# Patient Record
Sex: FEMALE | Race: WHITE | NOT HISPANIC OR LATINO | Employment: OTHER | URBAN - METROPOLITAN AREA
[De-identification: names, ages, dates, MRNs, and addresses within clinical notes are randomized per-mention and may not be internally consistent; named-entity substitution may affect disease eponyms.]

---

## 2019-06-26 ENCOUNTER — HOSPITAL ENCOUNTER (EMERGENCY)
Facility: MEDICAL CENTER | Age: 49
End: 2019-06-26
Attending: EMERGENCY MEDICINE
Payer: COMMERCIAL

## 2019-06-26 VITALS
OXYGEN SATURATION: 98 % | RESPIRATION RATE: 18 BRPM | SYSTOLIC BLOOD PRESSURE: 136 MMHG | HEIGHT: 66 IN | DIASTOLIC BLOOD PRESSURE: 78 MMHG | WEIGHT: 200.4 LBS | HEART RATE: 71 BPM | BODY MASS INDEX: 32.21 KG/M2 | TEMPERATURE: 98 F

## 2019-06-26 DIAGNOSIS — B35.4 TINEA CORPORIS: ICD-10-CM

## 2019-06-26 DIAGNOSIS — S39.012A STRAIN OF LUMBAR REGION, INITIAL ENCOUNTER: ICD-10-CM

## 2019-06-26 DIAGNOSIS — N39.0 URINARY TRACT INFECTION WITHOUT HEMATURIA, SITE UNSPECIFIED: ICD-10-CM

## 2019-06-26 LAB
APPEARANCE UR: ABNORMAL
BACTERIA #/AREA URNS HPF: ABNORMAL /HPF
BILIRUB UR QL STRIP.AUTO: NEGATIVE
COLOR UR: ABNORMAL
EPI CELLS #/AREA URNS HPF: ABNORMAL /HPF
GLUCOSE UR STRIP.AUTO-MCNC: NEGATIVE MG/DL
HYALINE CASTS #/AREA URNS LPF: ABNORMAL /LPF
KETONES UR STRIP.AUTO-MCNC: NEGATIVE MG/DL
LEUKOCYTE ESTERASE UR QL STRIP.AUTO: ABNORMAL
MICRO URNS: ABNORMAL
MUCOUS THREADS #/AREA URNS HPF: ABNORMAL /HPF
NITRITE UR QL STRIP.AUTO: POSITIVE
PH UR STRIP.AUTO: 5 [PH]
PROT UR QL STRIP: NEGATIVE MG/DL
RBC # URNS HPF: ABNORMAL /HPF
RBC UR QL AUTO: NEGATIVE
SP GR UR STRIP.AUTO: 1.04
UROBILINOGEN UR STRIP.AUTO-MCNC: 1 MG/DL
WBC #/AREA URNS HPF: ABNORMAL /HPF

## 2019-06-26 PROCEDURE — 87186 SC STD MICRODIL/AGAR DIL: CPT

## 2019-06-26 PROCEDURE — 87077 CULTURE AEROBIC IDENTIFY: CPT

## 2019-06-26 PROCEDURE — 87086 URINE CULTURE/COLONY COUNT: CPT

## 2019-06-26 PROCEDURE — 700102 HCHG RX REV CODE 250 W/ 637 OVERRIDE(OP): Performed by: EMERGENCY MEDICINE

## 2019-06-26 PROCEDURE — 700111 HCHG RX REV CODE 636 W/ 250 OVERRIDE (IP): Performed by: EMERGENCY MEDICINE

## 2019-06-26 PROCEDURE — 81001 URINALYSIS AUTO W/SCOPE: CPT

## 2019-06-26 PROCEDURE — A9270 NON-COVERED ITEM OR SERVICE: HCPCS | Performed by: EMERGENCY MEDICINE

## 2019-06-26 PROCEDURE — 99284 EMERGENCY DEPT VISIT MOD MDM: CPT

## 2019-06-26 RX ORDER — CYCLOBENZAPRINE HCL 10 MG
10 TABLET ORAL ONCE
Status: COMPLETED | OUTPATIENT
Start: 2019-06-26 | End: 2019-06-26

## 2019-06-26 RX ORDER — CYCLOBENZAPRINE HCL 10 MG
10 TABLET ORAL 3 TIMES DAILY PRN
Qty: 15 TAB | Refills: 0 | Status: SHIPPED | OUTPATIENT
Start: 2019-06-26 | End: 2023-03-31

## 2019-06-26 RX ORDER — CLOTRIMAZOLE 1 %
CREAM (GRAM) TOPICAL
Qty: 1 TUBE | Refills: 0 | Status: SHIPPED | OUTPATIENT
Start: 2019-06-26 | End: 2023-03-31

## 2019-06-26 RX ORDER — PREDNISONE 20 MG/1
40 TABLET ORAL DAILY
Qty: 8 TAB | Refills: 0 | Status: SHIPPED | OUTPATIENT
Start: 2019-06-26 | End: 2019-06-30

## 2019-06-26 RX ORDER — PREDNISONE 20 MG/1
50 TABLET ORAL ONCE
Status: COMPLETED | OUTPATIENT
Start: 2019-06-26 | End: 2019-06-26

## 2019-06-26 RX ORDER — LIDOCAINE 50 MG/G
1 PATCH TOPICAL EVERY 24 HOURS
Qty: 10 PATCH | Refills: 0 | Status: SHIPPED | OUTPATIENT
Start: 2019-06-26 | End: 2023-03-31

## 2019-06-26 RX ORDER — SULFAMETHOXAZOLE AND TRIMETHOPRIM 800; 160 MG/1; MG/1
1 TABLET ORAL 2 TIMES DAILY
Qty: 14 TAB | Refills: 0 | Status: SHIPPED | OUTPATIENT
Start: 2019-06-26 | End: 2019-07-03

## 2019-06-26 RX ORDER — HYDROCODONE BITARTRATE AND ACETAMINOPHEN 5; 325 MG/1; MG/1
1 TABLET ORAL ONCE
Status: COMPLETED | OUTPATIENT
Start: 2019-06-26 | End: 2019-06-26

## 2019-06-26 RX ORDER — SULFAMETHOXAZOLE AND TRIMETHOPRIM 800; 160 MG/1; MG/1
1 TABLET ORAL ONCE
Status: COMPLETED | OUTPATIENT
Start: 2019-06-26 | End: 2019-06-26

## 2019-06-26 RX ADMIN — CYCLOBENZAPRINE 10 MG: 10 TABLET, FILM COATED ORAL at 06:18

## 2019-06-26 RX ADMIN — SULFAMETHOXAZOLE AND TRIMETHOPRIM 1 TABLET: 800; 160 TABLET ORAL at 07:25

## 2019-06-26 RX ADMIN — HYDROCODONE BITARTRATE AND ACETAMINOPHEN 1 TABLET: 5; 325 TABLET ORAL at 06:18

## 2019-06-26 RX ADMIN — PREDNISONE 50 MG: 20 TABLET ORAL at 06:19

## 2019-06-26 ASSESSMENT — ENCOUNTER SYMPTOMS
FALLS: 0
HEADACHES: 0
BACK PAIN: 1
VOMITING: 0
FEVER: 0
COUGH: 0
SHORTNESS OF BREATH: 0
NECK PAIN: 0

## 2019-06-26 ASSESSMENT — LIFESTYLE VARIABLES: DO YOU DRINK ALCOHOL: NO

## 2019-06-26 NOTE — ED TRIAGE NOTES
"Chief Complaint   Patient presents with   • Low Back Pain     worsening over x2 wks, difficulty sitting, walking   • Rash     red bumps over chest arms, x2 wks     /95   Pulse 74   Temp 36.4 °C (97.6 °F) (Temporal)   Resp 20   Ht 1.676 m (5' 6\")   Wt 90.9 kg (200 lb 6.4 oz)   SpO2 99%     Pt ambulates with a slow gait to triage, pt has difficulty sitting in a chair, obvious discomfort. Pt states the rash and back pain started at the same time. Denies trauma or specific incident leading up to back pain.   "

## 2019-06-26 NOTE — ED NOTES
Pt medicated per MAR. Discharge orders received, instructions and education given, follow-up discussed, prescription x4 given, pt verbalized understanding.
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
## 2019-06-26 NOTE — LETTER
6/28/2019               Thelma Segovia  [address]  [facility] [address]        Dear Thelma (MR#[id number])    This letter is sent in regards to your, recent visit to the [facility] Emergency Department on 6/26/2019.  During the visit, tests were performed to assist the physician in a medical diagnosis.  A review of those tests requires that we notify you of the following:    Your urine culture was POSITIVE for a bacteria called Escherichia coli. The antibiotic prescribed for you (sulfamethoxazole-trimethoprim) should be active to treat this bacteria. IT IS IMPORTANT THAT YOU CONTINUE TAKING YOUR ANTIBIOTIC UNTIL IT IS FINISHED.      Please feel free to contact me at the number below if you have any questions or concerns. Thank you for your cooperation in the matter.    Sincerely,  ED Culture Follow-Up Staff  Krystal Cagle, PharmD    [facility], Emergency Department  [address], [place] [id number] [phone] (ED Culture Line)  [phone]

## 2019-06-28 LAB
BACTERIA UR CULT: ABNORMAL
BACTERIA UR CULT: ABNORMAL
SIGNIFICANT IND 70042: ABNORMAL
SITE SITE: ABNORMAL
SOURCE SOURCE: ABNORMAL

## 2019-06-28 NOTE — ED NOTES
"ED Positive Culture Follow-up/Notification Note:    Date: 6/28/19     Patient seen in the ED on 6/26/2019 for low back pain and urinary frequency with history of UTIs..   1. Strain of lumbar region, initial encounter    2. Tinea corporis    3. Urinary tract infection without hematuria, site unspecified       Discharge Medication List as of 6/26/2019  7:20 AM      START taking these medications    Details   predniSONE (DELTASONE) 20 MG Tab Take 2 Tabs by mouth every day for 4 days., Disp-8 Tab, R-0, Print Rx Paper      cyclobenzaprine (FLEXERIL) 10 MG Tab Take 1 Tab by mouth 3 times a day as needed., Disp-15 Tab, R-0, Print Rx Paper      lidocaine (LIDODERM) 5 % Patch Apply 1 Patch to skin as directed every 24 hours., Disp-10 Patch, R-0, Print Rx Paper      sulfamethoxazole-trimethoprim (BACTRIM DS) 800-160 MG tablet Take 1 Tab by mouth 2 times a day for 7 days., Disp-14 Tab, R-0, Print Rx Paper             Allergies: Pcn [penicillins]; Pcn [penicillins]; and Pcn [penicillins]     Vitals:    06/26/19 0509 06/26/19 0514 06/26/19 0727   BP: 156/95  136/78   Pulse: 74  71   Resp: 20  18   Temp: 36.4 °C (97.6 °F)  36.7 °C (98 °F)   TempSrc: Temporal  Temporal   SpO2: 99%  98%   Weight:  90.9 kg (200 lb 6.4 oz)    Height: 1.676 m (5' 6\")         Final cultures:   Results     Procedure Component Value Units Date/Time    URINE CULTURE(NEW) [[id number]]  (Abnormal)  (Susceptibility) Collected:  06/26/19 0621    Order Status:  Completed Specimen:  Urine Updated:  06/28/19 0852     Significant Indicator POS (POS)     Source UR     Site -     Culture Result - (A)      Escherichia coli  >100,000 cfu/mL   (A)    Culture & Susceptibility     ESCHERICHIA COLI     Antibiotic Sensitivity Microscan Unit Status    Ampicillin Resistant >16 mcg/mL Final    Method: ALISA    Cefepime Sensitive <=8 mcg/mL Final    Method: ALISA    Cefotaxime Sensitive <=2 mcg/mL Final    Method: ALISA    Cefotetan Sensitive <=16 mcg/mL Final    Method: ALISA    " Ceftazidime Sensitive <=1 mcg/mL Final    Method: ALISA    Ceftriaxone Sensitive <=8 mcg/mL Final    Method: ALISA    Cefuroxime Sensitive <=4 mcg/mL Final    Method: ALISA    Cephalothin Resistant >16 mcg/mL Final    Method: ALISA    Ciprofloxacin Sensitive <=1 mcg/mL Final    Method: ALISA    Gentamicin Sensitive <=4 mcg/mL Final    Method: ALISA    Levofloxacin Sensitive <=2 mcg/mL Final    Method: ALISA    Nitrofurantoin Sensitive <=32 mcg/mL Final    Method: ALISA    Pip/Tazobactam Sensitive <=16 mcg/mL Final    Method: ALISA    Piperacillin Resistant >64 mcg/mL Final    Method: ALISA    Tigecycline Sensitive <=2 mcg/mL Final    Method: ALISA    Tobramycin Sensitive <=4 mcg/mL Final    Method: ALISA    Trimeth/Sulfa Sensitive <=2/38 mcg/mL Final    Method: ALISA                       URINALYSIS,CULTURE IF INDICATED [[id number]]  (Abnormal) Collected:  06/26/19 0621    Order Status:  Completed Specimen:  Urine from Urine, Clean Catch Updated:  06/26/19 0709     Color DK Yellow     Character Cloudy (A)     Specific Gravity 1.037     Ph 5.0     Glucose Negative mg/dL      Ketones Negative mg/dL      Protein Negative mg/dL      Bilirubin Negative     Urobilinogen, Urine 1.0     Nitrite Positive (A)     Leukocyte Esterase Small (A)     Occult Blood Negative     Micro Urine Req Microscopic          Plan:   Appropriate antibiotic therapy prescribed. No changes required based upon culture result.  Sent letter to patient to notify of positive culture result and encourage compliance with prescribed antibiotics.     Krystal Cagle

## 2021-12-11 ENCOUNTER — HOSPITAL ENCOUNTER (EMERGENCY)
Facility: MEDICAL CENTER | Age: 51
End: 2021-12-11
Attending: EMERGENCY MEDICINE

## 2021-12-11 VITALS
HEIGHT: 66 IN | TEMPERATURE: 97.1 F | OXYGEN SATURATION: 96 % | RESPIRATION RATE: 15 BRPM | WEIGHT: 226.19 LBS | SYSTOLIC BLOOD PRESSURE: 156 MMHG | HEART RATE: 83 BPM | BODY MASS INDEX: 36.35 KG/M2 | DIASTOLIC BLOOD PRESSURE: 88 MMHG

## 2021-12-11 DIAGNOSIS — S76.219A GROIN STRAIN, UNSPECIFIED LATERALITY, INITIAL ENCOUNTER: ICD-10-CM

## 2021-12-11 PROCEDURE — 99284 EMERGENCY DEPT VISIT MOD MDM: CPT

## 2021-12-11 PROCEDURE — A9270 NON-COVERED ITEM OR SERVICE: HCPCS | Performed by: EMERGENCY MEDICINE

## 2021-12-11 PROCEDURE — 700102 HCHG RX REV CODE 250 W/ 637 OVERRIDE(OP): Performed by: EMERGENCY MEDICINE

## 2021-12-11 RX ORDER — OXYCODONE HYDROCHLORIDE AND ACETAMINOPHEN 5; 325 MG/1; MG/1
1 TABLET ORAL ONCE
Status: COMPLETED | OUTPATIENT
Start: 2021-12-11 | End: 2021-12-11

## 2021-12-11 RX ADMIN — OXYCODONE HYDROCHLORIDE AND ACETAMINOPHEN 1 TABLET: 5; 325 TABLET ORAL at 23:24

## 2021-12-11 ASSESSMENT — PAIN DESCRIPTION - DESCRIPTORS: DESCRIPTORS: SHARP

## 2021-12-12 NOTE — ED PROVIDER NOTES
ED Provider Note    CHIEF COMPLAINT  Chief Complaint   Patient presents with   • Leg Pain     Patient c/o of bilateral thigh and groin pain, couple days ago patient reports she twisted her R knee and this is when all the pain started.    • Groin Pain     bilateral, started on the right side about 6 days ago and a couple of days ago it was bilateral. Pt having difficulty walking due to the pain. Denies trauma or fall. Denies having neuropathy.       HPI  Thelma Segovia is a 51 y.o. female who presents with bilateral groin pain.  The patient states this started a couple of days ago.  She states recently she hurt her knee and the groin pain started subsequently.  She has not had any acute trauma to the groin.  She has not had any associated fevers.  She does not have any loss of function to the lower extremities.    REVIEW OF SYSTEMS  See HPI for further details. All other systems are negative.     PAST MEDICAL HISTORY  Past Medical History:   Diagnosis Date   • MI (myocardial infarction) ([facility]) 2007   • COPD (chronic obstructive pulmonary disease) ([facility])    • Hypertension    • Lupus ([facility])        FAMILY HISTORY  [unfilled]    SOCIAL HISTORY  Social History     Socioeconomic History   • Marital status: Single     Spouse name: Not on file   • Number of children: Not on file   • Years of education: Not on file   • Highest education level: Not on file   Occupational History   • Not on file   Tobacco Use   • Smoking status: Current Every Day Smoker     Packs/day: 1.00     Types: Cigarettes   • Smokeless tobacco: Never Used   Substance and Sexual Activity   • Alcohol use: No   • Drug use: No   • Sexual activity: Not on file   Other Topics Concern   • Not on file   Social History Narrative    ** Merged History Encounter **         ** Merged History Encounter **          Social Determinants of Health     Financial Resource Strain:    • Difficulty of Paying Living Expenses: Not on file   Food Insecurity:    • Worried About  "Running Out of Food in the Last Year: Not on file   • Ran Out of Food in the Last Year: Not on file   Transportation Needs:    • Lack of Transportation (Medical): Not on file   • Lack of Transportation (Non-Medical): Not on file   Physical Activity:    • Days of Exercise per Week: Not on file   • Minutes of Exercise per Session: Not on file   Stress:    • Feeling of Stress : Not on file   Social Connections:    • Frequency of Communication with Friends and Family: Not on file   • Frequency of Social Gatherings with Friends and Family: Not on file   • Attends [employer] Services: Not on file   • Active Member of Clubs or Organizations: Not on file   • Attends Club or Organization Meetings: Not on file   • Marital Status: Not on file   Intimate Partner Violence:    • Fear of Current or Ex-Partner: Not on file   • Emotionally Abused: Not on file   • Physically Abused: Not on file   • Sexually Abused: Not on file   Housing Stability:    • Unable to Pay for Housing in the Last Year: Not on file   • Number of Places Lived in the Last Year: Not on file   • Unstable Housing in the Last Year: Not on file       SURGICAL HISTORY  Past Surgical History:   Procedure Laterality Date   • HYSTERECTOMY LAPAROSCOPY     • HYSTERECTOMY, TOTAL ABDOMINAL     • PB C-SEC ONLY,PBEV C-SEC         CURRENT MEDICATIONS  Home Medications     Reviewed by Janette Akbar R.N. (Registered Nurse) on 12/11/21 at 2136  Med List Status: Partial   Medication Last Dose Status   clotrimazole (LOTRIMIN) 1 % Cream  Active   cyclobenzaprine (FLEXERIL) 10 MG Tab  Active   lidocaine (LIDODERM) 5 % Patch  Active                ALLERGIES  Allergies   Allergen Reactions   • Pcn [Penicillins] Anaphylaxis   • Pcn [Penicillins]    • Pcn [Penicillins] Shortness of Breath and Swelling       PHYSICAL EXAM  VITAL SIGNS: /102   Pulse 89   Temp 36.1 °C (97 °F) (Temporal)   Resp 16   Ht 1.676 m (5' 6\")   Wt 103 kg (226 lb 3.1 oz)   SpO2 97%   BMI 36.51 " kg/m²       Constitutional: Mild acute distress, Non-toxic appearance.   HENT: Normocephalic, Atraumatic, Bilateral external ears normal, Oropharynx moist, No oral exudates, Nose normal.   Eyes: PERRLA, EOMI, Conjunctiva normal, No discharge.   Neck: Normal range of motion, No tenderness, Supple, No stridor.   Lymphatic: No lymphadenopathy noted.   Cardiovascular: Normal heart rate, Normal rhythm, No murmurs, No rubs, No gallops.   Thorax & Lungs: Normal breath sounds, No respiratory distress, No wheezing, No chest tenderness.   Abdomen: Bowel sounds normal, Soft, No tenderness, No masses, No pulsatile masses.   Skin: Warm, Dry, No erythema, No rash.   Back: No tenderness, No CVA tenderness.   Extremities: Bilateral reproducible groin discomfort, no obvious deformities, no pain with internal and external rotation in the hip bilaterally, with intact distal pulses, No edema, No tenderness, No cyanosis, No clubbing.   Neurologic: Alert & oriented x 3, Normal motor function, Normal sensory function, No focal deficits noted.   Psychiatric: Affect normal, Judgment normal, Mood normal.     COURSE & MEDICAL DECISION MAKING  Pertinent Labs & Imaging studies reviewed. (See chart for details)  This a 51-year-old female who presents with bilateral groin discomfort.  I suspect this is from a muscular source.  The patient has good distal pulses and she did not have any trauma.  She will receive a Percocet tablet for acute pain control this evening.  She will start taking 2 tablets of Motrin followed by 2 tablets of Tylenol every 4 hours and she will return if she is not better in 72 hours that she does not have a primary care doctor.    FINAL IMPRESSION  1.  Bilateral groin strain    Disposition  The patient will be discharged in stable condition         Electronically signed by: Phu Powell M.D., 12/11/2021 11:17 PM

## 2021-12-12 NOTE — ED NOTES
"Pt medicated per MAR for pain. /88   Pulse 83   Temp 36.2 °C (97.1 °F) (Temporal)   Resp 15   Ht 1.676 m (5' 6\")   Wt 103 kg (226 lb 3.1 oz)   SpO2 96%   BMI 36.51 kg/m²     "

## 2021-12-12 NOTE — ED TRIAGE NOTES
"Chief Complaint   Patient presents with   • Leg Pain     Patient c/o of bilateral thigh and groin pain, couple days ago patient reports she twisted her R knee and this is when all the pain started.    • Groin Pain     bilateral, started on the right side about 6 days ago and a couple of days ago it was bilateral. Pt having difficulty walking due to the pain. Denies trauma or fall. Denies having neuropathy.       50 yo F to triage for above complaint. Patient c/o of bilateral thigh and groin pain, couple days ago patient reports she twisted her R knee and this is when all the pain started. Patient reports bilateral groin pain, started on the right side about 6 days ago and a couple of days ago it was bilateral. Pt having difficulty walking due to the pain. Denies trauma or fall. Denies having neuropathy or history of blood clots. Pt reports she works on her feet all day so work has been difficult. GCS 15.     Pt is alert and oriented, speaking in full sentences, follows commands and responds appropriately to questions. Resp are even and unlabored.      Pt placed in lobby. Pt educated on triage process. Pt encouraged to alert staff for any changes.     Patient and staff wearing appropriate PPE    /102   Pulse 89   Temp 36.1 °C (97 °F) (Temporal)   Resp 16   Ht 1.676 m (5' 6\")   Wt 103 kg (226 lb 3.1 oz)   SpO2 97%   BMI 36.51 kg/m²   "

## 2023-03-02 ENCOUNTER — HOSPITAL ENCOUNTER (EMERGENCY)
Facility: MEDICAL CENTER | Age: 53
End: 2023-03-02
Payer: COMMERCIAL

## 2023-03-02 VITALS
HEIGHT: 66 IN | OXYGEN SATURATION: 97 % | TEMPERATURE: 97.5 F | WEIGHT: 232.37 LBS | RESPIRATION RATE: 16 BRPM | DIASTOLIC BLOOD PRESSURE: 93 MMHG | BODY MASS INDEX: 37.34 KG/M2 | SYSTOLIC BLOOD PRESSURE: 144 MMHG | HEART RATE: 78 BPM

## 2023-03-02 PROCEDURE — 302449 STATCHG TRIAGE ONLY (STATISTIC)

## 2023-03-02 NOTE — ED NOTES
Called again with no response from the lobby or bathroom  
Called pt for re vital with no response  
Will start Lantus 10 units at bed time.  Will start Humalog 6 units before each meal in addition to Humalog correction scale coverage.  Patient counseled for compliance with consistent low carb diet, exercise.

## 2023-03-02 NOTE — ED TRIAGE NOTES
"Chief Complaint   Patient presents with    Foot Pain     Reports a LT hip replacement Jan 20th.   Traveled to get here over the last 4 days.   During that time developed LT foot pain/swelling and redness. Warm to touch.   Numbness to toes, no sensation to 4th and 5th toes.   Pain starting to radiate up leg.    Foot Swelling     Ambulatory with limped gait to triage for above.     BP (!) 144/93   Pulse 78   Temp 36.4 °C (97.5 °F) (Temporal)   Resp 16   Ht 1.676 m (5' 6\")   Wt 105 kg (232 lb 5.8 oz)   SpO2 97%   BMI 37.50 kg/m²     "

## 2023-03-14 ENCOUNTER — TELEPHONE (OUTPATIENT)
Dept: CARDIOTHORACIC SURGERY | Facility: MEDICAL CENTER | Age: 53
End: 2023-03-14

## 2023-03-14 NOTE — TELEPHONE ENCOUNTER
Got patients referral. Spoke with Huong and patient needs to establish with a pcp provider and needs an echo done. According to the referral, patient does not have insurance is unable to be seen in our office. LVM for patient and gave contact info for [facility] for patient to get established.

## 2023-03-31 ENCOUNTER — OFFICE VISIT (OUTPATIENT)
Dept: MEDICAL GROUP | Facility: CLINIC | Age: 53
End: 2023-03-31
Payer: COMMERCIAL

## 2023-03-31 VITALS
OXYGEN SATURATION: 98 % | TEMPERATURE: 97.3 F | SYSTOLIC BLOOD PRESSURE: 115 MMHG | RESPIRATION RATE: 16 BRPM | HEART RATE: 90 BPM | WEIGHT: 228 LBS | HEIGHT: 66 IN | BODY MASS INDEX: 36.64 KG/M2 | DIASTOLIC BLOOD PRESSURE: 72 MMHG

## 2023-03-31 DIAGNOSIS — I71.21 ANEURYSM OF ASCENDING AORTA WITHOUT RUPTURE (HCC): ICD-10-CM

## 2023-03-31 DIAGNOSIS — Z96.642 HISTORY OF LEFT HIP REPLACEMENT: ICD-10-CM

## 2023-03-31 DIAGNOSIS — F41.9 ANXIETY: ICD-10-CM

## 2023-03-31 DIAGNOSIS — F17.200 SMOKING: ICD-10-CM

## 2023-03-31 PROCEDURE — 99204 OFFICE O/P NEW MOD 45 MIN: CPT | Mod: GE

## 2023-03-31 RX ORDER — CEPHALEXIN 500 MG/1
CAPSULE ORAL
COMMUNITY
Start: 2023-02-21 | End: 2023-03-31

## 2023-03-31 RX ORDER — CEFADROXIL 500 MG/1
CAPSULE ORAL
COMMUNITY
Start: 2023-01-30 | End: 2023-03-31

## 2023-03-31 RX ORDER — TRAMADOL HYDROCHLORIDE 50 MG/1
50 TABLET ORAL EVERY 6 HOURS PRN
COMMUNITY
Start: 2023-02-22 | End: 2023-03-31

## 2023-03-31 RX ORDER — HYDROCODONE BITARTRATE AND ACETAMINOPHEN 5; 325 MG/1; MG/1
1 TABLET ORAL EVERY 8 HOURS PRN
COMMUNITY
Start: 2023-01-12 | End: 2023-03-31

## 2023-03-31 RX ORDER — OXYCODONE HYDROCHLORIDE AND ACETAMINOPHEN 5; 325 MG/1; MG/1
TABLET ORAL
COMMUNITY
Start: 2023-01-31 | End: 2023-03-31

## 2023-03-31 RX ORDER — BUPROPION HYDROCHLORIDE 150 MG/1
150 TABLET ORAL EVERY MORNING
Qty: 30 TABLET | Refills: 0 | Status: SHIPPED | OUTPATIENT
Start: 2023-03-31 | End: 2023-06-06 | Stop reason: SDUPTHER

## 2023-03-31 RX ORDER — ONDANSETRON HYDROCHLORIDE 8 MG/1
8 TABLET, FILM COATED ORAL EVERY 8 HOURS PRN
COMMUNITY
Start: 2023-01-31 | End: 2023-03-31

## 2023-03-31 ASSESSMENT — PATIENT HEALTH QUESTIONNAIRE - PHQ9
SUM OF ALL RESPONSES TO PHQ QUESTIONS 1-9: 18
CLINICAL INTERPRETATION OF PHQ2 SCORE: 3
5. POOR APPETITE OR OVEREATING: 2 - MORE THAN HALF THE DAYS

## 2023-03-31 ASSESSMENT — ANXIETY QUESTIONNAIRES
4. TROUBLE RELAXING: NEARLY EVERY DAY
GAD7 TOTAL SCORE: 18
1. FEELING NERVOUS, ANXIOUS, OR ON EDGE: NEARLY EVERY DAY
5. BEING SO RESTLESS THAT IT IS HARD TO SIT STILL: NEARLY EVERY DAY
IF YOU CHECKED OFF ANY PROBLEMS ON THIS QUESTIONNAIRE, HOW DIFFICULT HAVE THESE PROBLEMS MADE IT FOR YOU TO DO YOUR WORK, TAKE CARE OF THINGS AT HOME, OR GET ALONG WITH OTHER PEOPLE: VERY DIFFICULT
6. BECOMING EASILY ANNOYED OR IRRITABLE: NEARLY EVERY DAY
2. NOT BEING ABLE TO STOP OR CONTROL WORRYING: NEARLY EVERY DAY
3. WORRYING TOO MUCH ABOUT DIFFERENT THINGS: NEARLY EVERY DAY
7. FEELING AFRAID AS IF SOMETHING AWFUL MIGHT HAPPEN: NOT AT ALL

## 2023-03-31 NOTE — PROGRESS NOTES
"Subjective:     CC:   No chief complaint on file.      HPI:   Thelma presents today to establish care:    Seen at [facility] ED for left leg swelling, incidental AAA found.     #AAA  Found in ED. No chest, abdominal, back pain. No increased SOB. No lightheadedness, dizziness, jaw pain, nausea, vomiting. Was seen by vascular surgery and told she needs order for echo.     #Anxiety  #Depression   Moved from [place] recently. Was on Wellbutrin in [place] but stopped 23.   Anxiety is elevated with new move and patient having to leave \"bad situation.\" No SI/HI.    #Smoking  History of 30-35 pack year smoking. Recent increase with anxiety. No hemoptysis or weight loss. AM cough, SOB with walking 100 feet. Has not been on medication  -Follow-up echo, likely component of COPD, will follow at next visit    #S/p Left hip replacement 2023  Hip replacement in , was supposed to be seen in 2023 for follow-up but had to leave. Walking without pain or difficulty, some residual left leg swelling.     #Social  Not currently working, lives with mother.   Smokes 1 ppd for 30-35 years.   No alcohol.   Marijuana.  No other drugs. No history of IV drugs   Has been screen for hepatitis C and was negative.   Last Tentanus was 4 years ago.     #Well Woman  History of  x1. Hysterectomy performed  for AUB, no cancer. Has cervix. Last pap smear was in . No bleeding since hysterectomy.         No problems updated.    Current Outpatient Medications Ordered in Epic   Medication Sig Dispense Refill    buPROPion (WELLBUTRIN XL) 150 MG XL tablet Take 1 Tablet by mouth every morning. 30 Tablet 0     No current Epic-ordered facility-administered medications on file.       ROS:  Negative except for above in HPI    Objective:     Exam:  /72 (BP Location: Left arm, Patient Position: Sitting, BP Cuff Size: Adult)   Pulse 90   Temp 36.3 °C (97.3 °F) (Temporal)   Resp 16   Ht 1.676 m (5' 6\")   Wt 103 kg " (228 lb)   SpO2 98%   BMI 36.80 kg/m²  Body mass index is 36.8 kg/m².    Gen: Alert and oriented, No apparent distress.  HEENT: NCAT, MMM, No lymphadenopathy  Lungs: Normal effort, Coarse lung sounds throughout, no wheezes, rhonchi, or rales  CV: Regular rate and rhythm. No murmurs, rubs, or gallops. Radial pulses palpable bilat  Abd: Soft, non-distended, no guarding, no rebound, non-tender to palpation, No abdominal masses palpated limited by habitus  Ext: No clubbing, cyanosis, edema.  Neuro: Non-focal    Labs: No new labs    Assessment & Plan:     53 y.o. female with the following -     #AAA  Incidental, found in ED. No red flag signs. Discussed size, risk factors, smoking cessation.   -Patient already seeing vascular surgery  -Echo sent     #Anxiety  #Depression   ZULEYKA-7 of 18, PHQ-9 of 18. No SI/HI or intent to harm self or others. Discussed options for treatment. Patient not interested in therapy. States Wellbutrin has worked in the past.   -Start Wellbutrin  -Follow in 2 weeks to up titrate medication to 150mg BID    #Smoking  History of 30-35 pack year smoking. Recent increase with anxiety. No hemoptysis or weight loss. AM cough, SOB with walking 100 feet. Has not been on medication  -Follow-up echo, likely component of COPD, will follow at next visit    #S/p Left hip replacement January 2023  -Referral to orthopedic surgery     #Timothy Woman   Patient last pap in 1992. No vaginal bleeding.  -Patient to schedule for pap smear     Follow in 2 weeks    Problem List Items Addressed This Visit    None  Visit Diagnoses       Aneurysm of ascending aorta without rupture (HCC)        Relevant Orders    EC-ECHOCARDIOGRAM COMPLETE W/O CONT    BMI 36.0-36.9,adult        Relevant Orders    HEMOGLOBIN A1C    Lipid Profile    Smoking        Relevant Medications    buPROPion (WELLBUTRIN XL) 150 MG XL tablet    Anxiety        Relevant Medications    buPROPion (WELLBUTRIN XL) 150 MG XL tablet    Other Relevant Orders    TSH  WITH REFLEX TO FT4    History of left hip replacement        Relevant Orders    Referral to Orthopedics            No follow-ups on file.

## 2023-04-07 ENCOUNTER — OFFICE VISIT (OUTPATIENT)
Dept: MEDICAL GROUP | Facility: CLINIC | Age: 53
End: 2023-04-07
Payer: COMMERCIAL

## 2023-04-07 VITALS — WEIGHT: 231 LBS | BODY MASS INDEX: 37.28 KG/M2

## 2023-04-07 DIAGNOSIS — F41.9 ANXIETY: ICD-10-CM

## 2023-04-07 DIAGNOSIS — R07.89 CHEST TIGHTNESS: ICD-10-CM

## 2023-04-07 DIAGNOSIS — R06.02 SHORTNESS OF BREATH: ICD-10-CM

## 2023-04-07 PROCEDURE — 99214 OFFICE O/P EST MOD 30 MIN: CPT | Mod: GE

## 2023-04-07 PROCEDURE — 93000 ELECTROCARDIOGRAM COMPLETE: CPT

## 2023-04-07 RX ORDER — FLUOXETINE 10 MG/1
10 CAPSULE ORAL DAILY
Qty: 30 CAPSULE | Refills: 0 | Status: SHIPPED | OUTPATIENT
Start: 2023-04-07 | End: 2023-06-06

## 2023-04-07 RX ORDER — ALBUTEROL SULFATE 90 UG/1
2 AEROSOL, METERED RESPIRATORY (INHALATION) EVERY 4 HOURS PRN
Qty: 1 EACH | Refills: 0 | Status: CANCELLED | OUTPATIENT
Start: 2023-04-07

## 2023-04-07 RX ORDER — ALBUTEROL SULFATE 90 UG/1
2 AEROSOL, METERED RESPIRATORY (INHALATION) EVERY 4 HOURS PRN
Qty: 1 EACH | Refills: 0 | Status: SHIPPED | OUTPATIENT
Start: 2023-04-07 | End: 2023-09-11 | Stop reason: SDUPTHER

## 2023-04-07 NOTE — PROGRESS NOTES
"Subjective:     CC:   Chief Complaint   Patient presents with    Follow-Up     2 week        HPI:   Thelma presents today with:    #Chest Tightness   #Shortness of Breath  Patient reports intermittent chest tightness and shortness of breath. Reports she has increased anxiety which she believes is contributing. Also has shortness of breath with exertion. No chest pain or pressure, no arm or jaw pain, no nausea, no abdominal pain, no new headaches, no change of vision, dizziness, syncope, lower extremity swelling, weakness, numbness, speech changes, difficulty with balance.     Patient with 30-35 pack year smoking history. Never diagnosed with lung condition.    #Anxiety  #Depression   Wellbutrin has made her \"very sad\" so she stopped taking. Has had increased anxiety and difficulty sleeping. No intent to harm self, SI, HI. Is open to trying therapy and new medication.      No problems updated.    Current Outpatient Medications Ordered in Epic   Medication Sig Dispense Refill    FLUoxetine (PROZAC) 10 MG Cap Take 1 Capsule by mouth every day. 30 Capsule 0    buPROPion (WELLBUTRIN XL) 150 MG XL tablet Take 1 Tablet by mouth every morning. 30 Tablet 0     No current Epic-ordered facility-administered medications on file.       ROS:  Negative except for above in HPI    Objective:     Exam:  BP (P) 122/73 (BP Location: Left arm, Patient Position: Sitting, BP Cuff Size: Adult)   Pulse (P) 83   Temp (P) 36.2 °C (97.1 °F) (Temporal)   Resp (P) 16   Ht (P) 1.676 m (5' 6\")   Wt 105 kg (231 lb)   SpO2 (P) 100%   BMI (P) 37.28 kg/m²  Body mass index is 37.28 kg/m² (pended).    Gen: Alert and oriented, No apparent distress.  HEENT: NCAT, MMM, No lymphadenopathy  Lungs: Normal effort, CTA bilaterally, no wheezes, rhonchi, or rales  CV: Regular rate and rhythm. No murmurs, rubs, or gallops. Radial pulses palpable bilat  Abd: Soft, non-distended, no guarding, no rebound, non-tender to palpation  Ext: No clubbing, cyanosis, " edema.  Neuro: Non-focal    Labs: No new labs    Assessment & Plan:     53 y.o. female with the following -       #Chest Tightness   #Shortness of Breath  Intermittent. Not associate with pain or red flag symptoms. PFTs today with 57.6% of predicted FEV1/FVC and EKG without acute ST or T wave changes, no evidence of ischemia. Differential includes COPD, ACS, AAA. FEV1/FVC consistent with COPD, unlikely cardiac in origin given normal ECG and symptomology. Discussed above with patient and in shared-decision making decided:   -Stat echocardiogram given difficulty obtaining and need to be evaluated by vascular surgery   -Referral to vascular surgery   -Start Albuterol q4h PRN, if improvement will switch to controller medication     #Anxiety  #Depression   Patient with increased anxiety, with moderate to severe depression and anxiety, no intent to harm self or others, SI or HI. Discussed medication management versus therapy versus combination. In shared decision making:   -Start Prozac every morning   -Schedule with Dr. Vergara at earliest convenience     Follow in 2-4 weeks on labs, COPD, anxiety or sooner with new or worsening concerns. Red flag symptoms and ED precautions discussed.     Problem List Items Addressed This Visit    None  Visit Diagnoses       Shortness of breath        Relevant Orders    PULMONARY FUNCTION TESTS -Test requested: Spirometry, simple    Chest tightness        Relevant Orders    EKG - Clinic Performed    Referral to Vascular Surgery    EC-ECHOCARDIOGRAM COMPLETE W/O CONT    Anxiety        Relevant Medications    FLUoxetine (PROZAC) 10 MG Cap            No follow-ups on file.

## 2023-04-07 NOTE — PATIENT INSTRUCTIONS
Schedule with psychologist Dr. Vergara     Start anxiety medication daily    Take 2 puffs inhaler every 4 hours as needed     Echo order given     Referral to vascular given

## 2023-04-13 ENCOUNTER — HOSPITAL ENCOUNTER (OUTPATIENT)
Dept: CARDIOLOGY | Facility: MEDICAL CENTER | Age: 53
End: 2023-04-13
Payer: COMMERCIAL

## 2023-04-13 DIAGNOSIS — R07.89 CHEST TIGHTNESS: ICD-10-CM

## 2023-04-13 LAB
LV EJECT FRACT  99904: 60
LV EJECT FRACT MOD 2C 99903: 62.29
LV EJECT FRACT MOD 4C 99902: 67.73
LV EJECT FRACT MOD BP 99901: 65.15

## 2023-04-13 PROCEDURE — 93306 TTE W/DOPPLER COMPLETE: CPT | Mod: 26 | Performed by: INTERNAL MEDICINE

## 2023-04-13 PROCEDURE — 93306 TTE W/DOPPLER COMPLETE: CPT

## 2023-04-24 ENCOUNTER — HOSPITAL ENCOUNTER (OUTPATIENT)
Dept: LAB | Facility: MEDICAL CENTER | Age: 53
End: 2023-04-24
Payer: COMMERCIAL

## 2023-04-24 ENCOUNTER — TELEPHONE (OUTPATIENT)
Dept: MEDICAL GROUP | Facility: CLINIC | Age: 53
End: 2023-04-24

## 2023-04-24 DIAGNOSIS — F41.9 ANXIETY: ICD-10-CM

## 2023-04-24 LAB
EST. AVERAGE GLUCOSE BLD GHB EST-MCNC: 108 MG/DL
HBA1C MFR BLD: 5.4 % (ref 4–5.6)
TSH SERPL DL<=0.005 MIU/L-ACNC: 1.05 UIU/ML (ref 0.38–5.33)

## 2023-04-24 PROCEDURE — 80061 LIPID PANEL: CPT

## 2023-04-24 PROCEDURE — 83036 HEMOGLOBIN GLYCOSYLATED A1C: CPT

## 2023-04-24 PROCEDURE — 84443 ASSAY THYROID STIM HORMONE: CPT

## 2023-04-24 PROCEDURE — 36415 COLL VENOUS BLD VENIPUNCTURE: CPT

## 2023-04-24 NOTE — TELEPHONE ENCOUNTER
Caller Name: Rosario Segovia  Call Back Number: [phone] (home) or [phone]      How would the patient prefer to be contacted with a response: Phone call OK to leave a detailed message    Referral   Patient called stating that the Referral that was submitted is incorrect that it should be to Cardio Thoratic Surgeon. Please advise.

## 2023-04-25 ENCOUNTER — OFFICE VISIT (OUTPATIENT)
Dept: MEDICAL GROUP | Facility: CLINIC | Age: 53
End: 2023-04-25
Payer: COMMERCIAL

## 2023-04-25 VITALS
DIASTOLIC BLOOD PRESSURE: 120 MMHG | TEMPERATURE: 98.1 F | HEART RATE: 96 BPM | OXYGEN SATURATION: 98 % | WEIGHT: 230.25 LBS | SYSTOLIC BLOOD PRESSURE: 160 MMHG | HEIGHT: 66 IN | BODY MASS INDEX: 37 KG/M2

## 2023-04-25 DIAGNOSIS — Z12.31 ENCOUNTER FOR SCREENING MAMMOGRAM FOR BREAST CANCER: ICD-10-CM

## 2023-04-25 DIAGNOSIS — I71.21 ANEURYSM OF ASCENDING AORTA WITHOUT RUPTURE (HCC): ICD-10-CM

## 2023-04-25 DIAGNOSIS — I71.40 ABDOMINAL AORTIC ANEURYSM (AAA) WITHOUT RUPTURE, UNSPECIFIED PART (HCC): ICD-10-CM

## 2023-04-25 DIAGNOSIS — F17.210 SMOKES WITH GREATER THAN 40 PACK YEAR HISTORY: ICD-10-CM

## 2023-04-25 DIAGNOSIS — Z12.11 SCREENING FOR COLORECTAL CANCER: ICD-10-CM

## 2023-04-25 DIAGNOSIS — I25.2 PERSONAL HISTORY OF MI (MYOCARDIAL INFARCTION): ICD-10-CM

## 2023-04-25 DIAGNOSIS — Z12.12 SCREENING FOR COLORECTAL CANCER: ICD-10-CM

## 2023-04-25 LAB
CHOLEST SERPL-MCNC: 179 MG/DL (ref 100–199)
FASTING STATUS PATIENT QL REPORTED: NORMAL
HDLC SERPL-MCNC: 51 MG/DL
LDLC SERPL CALC-MCNC: 103 MG/DL
TRIGL SERPL-MCNC: 125 MG/DL (ref 0–149)

## 2023-04-25 PROCEDURE — 99213 OFFICE O/P EST LOW 20 MIN: CPT | Mod: GE | Performed by: STUDENT IN AN ORGANIZED HEALTH CARE EDUCATION/TRAINING PROGRAM

## 2023-04-25 RX ORDER — ATORVASTATIN CALCIUM 40 MG/1
40 TABLET, FILM COATED ORAL EVERY EVENING
Qty: 90 TABLET | Refills: 1 | Status: SHIPPED | OUTPATIENT
Start: 2023-04-25

## 2023-04-25 RX ORDER — LOSARTAN POTASSIUM 25 MG/1
25 TABLET ORAL DAILY
Qty: 30 TABLET | Refills: 0 | Status: SHIPPED | OUTPATIENT
Start: 2023-04-25 | End: 2023-05-23

## 2023-04-25 RX ORDER — ASPIRIN 81 MG/1
81 TABLET ORAL DAILY
Qty: 90 TABLET | Refills: 3 | Status: SHIPPED | OUTPATIENT
Start: 2023-04-25

## 2023-04-25 NOTE — PROGRESS NOTES
Subjective:     CC: follow up    HPI:   Thelma presents today for:    Problem   Ascending Aortic Aneurysm (Hcc)    Documented      Personal History of MI (Myocardial Infarction)    Patient reports personal history of 3 separate heart attacks prior to moving to the area. [place] and has not taken any medication since moving.      Encounter for Screening Mammogram for Breast Cancer   Screening for Colorectal Cancer   Smokes With Greater Than 40 Pack Year History       Current Outpatient Medications Ordered in Epic   Medication Sig Dispense Refill    losartan (COZAAR) 25 MG Tab Take 1 Tablet by mouth every day for 30 days. 30 Tablet 0    atorvastatin (LIPITOR) 40 MG Tab Take 1 Tablet by mouth every evening. 90 Tablet 1    aspirin 81 MG EC tablet Take 1 Tablet by mouth every day. 90 Tablet 3    Blood Pressure Monitoring (COMFORT TOUCH BP CUFF/MEDIUM) Misc 1 Each every day. 1 Each 3    FLUoxetine (PROZAC) 10 MG Cap Take 1 Capsule by mouth every day. 30 Capsule 0    albuterol 108 (90 Base) MCG/ACT Aero Soln inhalation aerosol Inhale 2 Puffs every four hours as needed for Shortness of Breath. 1 Each 0    buPROPion (WELLBUTRIN XL) 150 MG XL tablet Take 1 Tablet by mouth every morning. (Patient not taking: Reported on 4/25/2023) 30 Tablet 0     No current Epic-ordered facility-administered medications on file.       Health Maintenance: due for complete preventive visit. No preventive care completed in many years    Mammography ordered  Pap scheduled  Colonoscopy referral placed  Labs completed  Discuss vaccinations at future appointment    ROS:  Gen: no fevers/chills, no changes in weight  Eyes: no changes in vision  ENT: no sore throat, no hearing loss, no bloody nose  Pulm: no sob, no cough  CV: no chest pain, no palpitations  GI: no nausea/vomiting, no diarrhea  : no dysuria  MSk: no myalgias  Skin: no rash  Neuro: no headaches, no numbness/tingling  Heme/Lymph: no easy  "bruising      Objective:     Exam:  BP (!) 140/100 (BP Location: Right arm, Patient Position: Sitting, BP Cuff Size: Adult)   Pulse 96   Temp 36.7 °C (98.1 °F) (Temporal)   Ht 1.676 m (5' 6\")   Wt 104 kg (230 lb 4 oz)   SpO2 98%   BMI 37.16 kg/m²  Body mass index is 37.16 kg/m².    Gen: Alert and oriented, No apparent distress.  Neck: Neck is supple without lymphadenopathy.  Lungs: Normal effort, CTA bilaterally, no wheezes, rhonchi, or rales  CV: Regular rate and rhythm. No murmurs, rubs, or gallops.  Ext: No clubbing, cyanosis, edema.      Labs: discussed results with patient as below     Latest Reference Range & Units 04/24/23 08:58   Glycohemoglobin 4.0 - 5.6 % 5.4   Estim. Avg Glu mg/dL 108   Fasting Status  Fasting   Cholesterol,Tot 100 - 199 mg/dL 179   Triglycerides 0 - 149 mg/dL 125   HDL >=40 mg/dL 51   LDL <100 mg/dL 103 (H)   TSH 0.380 - 5.330 uIU/mL 1.050   (H): Data is abnormally high    Assessment & Plan:     53 y.o. female with the following -     Problem List Items Addressed This Visit       Ascending aortic aneurysm (HCC)     Stable 4 cm ascending aortic aneurysm.   Smoking cessation discussed in detail. Offered medication and patches. Patient in contemplative stage.     Initiate losartan 25mg, will likely need to be increased and possibly second agent added. We discussed importance of home monitoring of BP and RTC in 1-2 weeks for BP log check and medication adjust ment. Goal BP <130/80. Proper measurement technique discussed    Referral to cardiothoracic surgery placed.     Initiate ASA 81 and statin    Discuss further imaging per cardiothoarcic surgery recommendations         Relevant Medications    losartan (COZAAR) 25 MG Tab    atorvastatin (LIPITOR) 40 MG Tab    Personal history of MI (myocardial infarction)     CAD high risk   Lipid panel is relatively unremarkable    Initiating BP medication losartan  ASA 81  Statin             Encounter for screening mammogram for breast cancer    " Relevant Orders    MA-SCREENING MAMMO BILAT W/TOMOSYNTHESIS W/CAD    Screening for colorectal cancer    Relevant Orders    Referral to GI for Colonoscopy    Smokes with greater than 40 pack year history    Relevant Orders    REFERRAL TO LUNG CANCER SCREENING PROGRAM

## 2023-04-26 ENCOUNTER — TELEPHONE (OUTPATIENT)
Dept: CARDIOTHORACIC SURGERY | Facility: MEDICAL CENTER | Age: 53
End: 2023-04-26

## 2023-04-26 NOTE — ASSESSMENT & PLAN NOTE
Stable 4 cm ascending aortic aneurysm.   Smoking cessation discussed in detail. Offered medication and patches. Patient in contemplative stage.     Initiate losartan 25mg, will likely need to be increased and possibly second agent added. We discussed importance of home monitoring of BP and RTC in 1-2 weeks for BP log check and medication adjust ment. Goal BP <130/80. Proper measurement technique discussed    Referral to cardiothoracic surgery placed.     Initiate ASA 81 and statin    Discuss further imaging per cardiothoarcic surgery recommendations

## 2023-04-26 NOTE — ASSESSMENT & PLAN NOTE
CAD high risk   Lipid panel is relatively unremarkable    Initiating BP medication losartan  ASA 81  Statin

## 2023-04-27 ENCOUNTER — TELEPHONE (OUTPATIENT)
Dept: CARDIOTHORACIC SURGERY | Facility: MEDICAL CENTER | Age: 53
End: 2023-04-27

## 2023-05-22 NOTE — PROGRESS NOTES
REFERRING PHYSICIAN: Renae Kahn MD.     CONSULTING PHYSICIAN: Cynthia Loo MD     CHIEF COMPLAINT: Shortness of Breath    HISTORY OF PRESENT ILLNESS: The patient is a 53 y.o. female with a past medical history of tobacco abuse, ascending aortic aneurysm, abdominal aortic aneurysm, previous MI x 3 who presents to our office after being seen by her PCP for ongoing shortness of breath recently.  She underwent a CT to rule out pulmonary embolism with unilateral leg edema and was found to have an ascending aortic aneurysm with measurements of 4.6 x 4.7 cm.  She denies chest pain, dizziness, syncope.   She has recently been started on hypertension medication by her PCP.  She reports BP's mostly in 160's and plan to review meds next visit.  Patient is accompanied by her mom today.      PAST MEDICAL HISTORY:   Active Ambulatory Problems     Diagnosis Date Noted    AAA (abdominal aortic aneurysm) (HCC) 04/25/2023    Ascending aortic aneurysm (HCC) 04/25/2023    Personal history of MI (myocardial infarction) 04/25/2023    Encounter for screening mammogram for breast cancer 04/25/2023    Screening for colorectal cancer 04/25/2023    Smokes with greater than 40 pack year history 04/25/2023     Resolved Ambulatory Problems     Diagnosis Date Noted    No Resolved Ambulatory Problems     Past Medical History:   Diagnosis Date    COPD (chronic obstructive pulmonary disease) (HCC)     Hypertension     Lupus (HCC)     MI (myocardial infarction) ([facility]) 2007       PAST SURGICAL HISTORY:   Past Surgical History:   Procedure Laterality Date    HYSTERECTOMY LAPAROSCOPY      HYSTERECTOMY, TOTAL ABDOMINAL      HI C-SEC ONLY,PREV C-SEC          ALLERGIES:   Allergies   Allergen Reactions    Pcn [Penicillins] Anaphylaxis    Pcn [Penicillins]     Pcn [Penicillins] Shortness of Breath and Swelling        CURRENT MEDICATIONS:   Current Outpatient Medications:     losartan (COZAAR) 25 MG Tab, Take 1 Tablet by mouth every day  for 30 days., Disp: 30 Tablet, Rfl: 0    atorvastatin (LIPITOR) 40 MG Tab, Take 1 Tablet by mouth every evening., Disp: 90 Tablet, Rfl: 1    aspirin 81 MG EC tablet, Take 1 Tablet by mouth every day., Disp: 90 Tablet, Rfl: 3    Blood Pressure Monitoring (COMFORT TOUCH BP CUFF/MEDIUM) Misc, 1 Each every day., Disp: 1 Each, Rfl: 3    FLUoxetine (PROZAC) 10 MG Cap, Take 1 Capsule by mouth every day., Disp: 30 Capsule, Rfl: 0    albuterol 108 (90 Base) MCG/ACT Aero Soln inhalation aerosol, Inhale 2 Puffs every four hours as needed for Shortness of Breath., Disp: 1 Each, Rfl: 0    buPROPion (WELLBUTRIN XL) 150 MG XL tablet, Take 1 Tablet by mouth every morning. (Patient not taking: Reported on 4/25/2023), Disp: 30 Tablet, Rfl: 0    FAMILY HISTORY: No family history on file.     SOCIAL HISTORY:   Social History     Socioeconomic History    Marital status: Single     Spouse name: Not on file    Number of children: Not on file    Years of education: Not on file    Highest education level: Not on file   Occupational History    Not on file   Tobacco Use    Smoking status: Every Day     Packs/day: 1.00     Years: 35.00     Pack years: 35.00     Types: Cigarettes    Smokeless tobacco: Never   Substance and Sexual Activity    Alcohol use: No    Drug use: No    Sexual activity: Not on file   Other Topics Concern    Not on file   Social History Narrative    ** Merged History Encounter **         ** Merged History Encounter **          Social Determinants of Health     Financial Resource Strain: Not on file   Food Insecurity: Not on file   Transportation Needs: Not on file   Physical Activity: Not on file   Stress: Not on file   Social Connections: Not on file   Intimate Partner Violence: Not on file   Housing Stability: Not on file       REVIEW OF SYSTEMS:  Review of Systems   Constitutional:  Negative for chills, fever and weight loss.   HENT:  Negative for ear pain, nosebleeds and tinnitus.    Eyes:  Negative for double vision,  "photophobia and pain.   Respiratory:  Positive for shortness of breath. Negative for cough and hemoptysis.    Cardiovascular:  Negative for chest pain, palpitations, orthopnea, leg swelling and PND.   Gastrointestinal:  Negative for abdominal pain, blood in stool, nausea and vomiting.   Genitourinary:  Negative for frequency, hematuria and urgency.   Musculoskeletal: Negative.    Skin:  Negative for rash.   Neurological:  Negative for dizziness, tremors, speech change, focal weakness, seizures and headaches.   Endo/Heme/Allergies:  Negative for polydipsia. Does not bruise/bleed easily.   Psychiatric/Behavioral:  Negative for hallucinations and memory loss.          PHYSICAL EXAMINATION:    BP (!) 140/82 (BP Location: Left arm, Patient Position: Sitting, BP Cuff Size: Adult long)   Pulse 83   Temp 36.8 °C (98.2 °F) (Temporal)   Ht 1.676 m (5' 6\")   Wt 104 kg (228 lb 6.3 oz)   SpO2 97%   BMI 36.86 kg/m²    Physical Exam  Vitals reviewed.   Constitutional:       General: She is not in acute distress.     Appearance: Normal appearance.   HENT:      Head: Normocephalic and atraumatic.      Right Ear: External ear normal.      Left Ear: External ear normal.      Nose: Nose normal. No congestion.   Eyes:      General: No scleral icterus.     Extraocular Movements: Extraocular movements intact.      Conjunctiva/sclera: Conjunctivae normal.   Cardiovascular:      Rate and Rhythm: Normal rate and regular rhythm.   Pulmonary:      Effort: Pulmonary effort is normal. No respiratory distress.   Abdominal:      General: Abdomen is flat. There is no distension.   Musculoskeletal:         General: Normal range of motion.      Cervical back: Normal range of motion.   Skin:     General: Skin is warm and dry.   Neurological:      Mental Status: She is alert and oriented to person, place, and time. Mental status is at baseline.      Cranial Nerves: No cranial nerve deficit.   Psychiatric:         Mood and Affect: Mood normal.    "      Judgment: Judgment normal.             LABS REVIEWED:  Lab Results   Component Value Date/Time    SODIUM 143 03/04/2023 02:11 PM    SODIUM 136 01/23/2016 05:05 AM    POTASSIUM 3.0 (L) 03/04/2023 02:11 PM    POTASSIUM 3.5 (L) 01/23/2016 05:05 AM    CHLORIDE 107 03/04/2023 02:11 PM    CHLORIDE 106 01/23/2016 05:05 AM    CO2 26.0 03/04/2023 02:11 PM    CO2 19 (L) 01/23/2016 05:05 AM    GLUCOSE 118 03/04/2023 02:11 PM    GLUCOSE 111 (H) 01/23/2016 05:05 AM    BUN 7.0 03/04/2023 02:11 PM    BUN 9 01/23/2016 05:05 AM    CREATININE 0.8 03/04/2023 02:11 PM    CREATININE 0.98 01/23/2016 05:05 AM    BUNCREATRAT 8.8 (L) 03/04/2023 02:11 PM      Lab Results   Component Value Date/Time    PROTHROMBTM 14.0 01/23/2016 05:05 AM    INR 1.08 01/23/2016 05:05 AM      Lab Results   Component Value Date/Time    WBC 8.6 01/23/2016 05:05 AM    RBC 5.08 01/23/2016 05:05 AM    HEMOGLOBIN 14.3 01/23/2016 05:05 AM    HEMATOCRIT 42.1 01/23/2016 05:05 AM    MCV 82.9 01/23/2016 05:05 AM    MCH 28.1 01/23/2016 05:05 AM    MCHC 34.0 01/23/2016 05:05 AM    MPV 10.2 01/23/2016 05:05 AM    NEUTSPOLYS 63.00 01/23/2016 05:05 AM    LYMPHOCYTES 25.30 01/23/2016 05:05 AM    MONOCYTES 9.10 01/23/2016 05:05 AM    EOSINOPHILS 1.70 01/23/2016 05:05 AM    BASOPHILS 0.70 01/23/2016 05:05 AM    HYPOCHROMIA 1+ 09/05/2013 10:45 AM        IMAGING REVIEWED AND INTERPRETED:    ECHOCARDIOGRAM 4/13/23 [facility]  No prior study is available for comparison.   The left ventricular ejection fraction is visually estimated to be 60%.  Normal regional wall motion.  The ascending aorta is borderline dilated with a diameter of 4 cm.    CARDIAC CATHETERIZATION   Nothing Recent    CT SCAN CHEST 3/4/23 [facility]  Lungs are clear.  There are no pulmonary nodules.  No bronchiectasis is present.  No evidence of interstitial fibrosis.     There are no pleural or pericardial effusions.     Aortic arch vessels demonstrate origin of the left common carotid artery from the right artery  consistent with a bovine type arch.  Ascending aorta is aneurysmal and measures 46 mm by 47 mm AP.  At the level of aortic valve the diameter is 38 mm.  Aortic arch is 28 mm in diameter.  Ascending thoracic aorta is normal in 29 mm.     No hilar or mediastinal adenopathy is seen.     Pulmonary arteries show satisfactory enhancement and no evidence of emboli.     In the upper abdomen there is a 15 mm stone in the upper posterior portion of the right kidney.      IMPRESSION:  Extremely pleasant 54 yo woman with known conditions of ongoing tobacco abuse, ascending aortic aneurysm, abdominal aortic aneurysm, previous MI x 3 (per patient). She had an ascending aortic aneurysm incidentally noted on recent CT to r/o PE. She is here to discuss aneurysm management      PLAN:  I explained to the patient and her mom the diagnosis of ascending aortic aneurysm and its anatomic location. We discussed at length the usual indications for intervention and that elective ascending aortic aneurysm repair is really a preventative measure if the risk of aortic emergency is greater than the risk of operation. I explained, not only absolute size criteria but also rate of growth criteria and intervention in the setting of other cardiac surgery. I explained to the patient that the aneurysm does not meet criteria at this point for intervention.  I will recommend surveillance and repeat CT scan to assess for growth. We will refer her to aneurysm surveillance clinic.  We also discussed the signs and symptoms of aortic emergency and the need to seek emergent medical care if these arise.    The patient is referred to surveillance clinic for aortic aneurysm.     We discussed the important risk factors for aortic emergency in addition to size are HTN and cigarette smoking.  - Patient recently started on antihypertensives recommend eventual titration to goal SBP ~120 or lower if tolerated and additional anti-impulse therapy with beta blocker for  goal heart rate <70 or lower if tolerated.  -Patient is interested in smoking cessation program: recommend she discuss with PCP her next visit for referral. I very much encourage this to reduce risk of aortic emergency.      Sincerely,       Cynthia Loo MD.

## 2023-05-23 ENCOUNTER — OFFICE VISIT (OUTPATIENT)
Dept: CARDIOTHORACIC SURGERY | Facility: MEDICAL CENTER | Age: 53
End: 2023-05-23
Payer: COMMERCIAL

## 2023-05-23 VITALS
SYSTOLIC BLOOD PRESSURE: 140 MMHG | TEMPERATURE: 98.2 F | OXYGEN SATURATION: 97 % | HEART RATE: 83 BPM | WEIGHT: 228.4 LBS | BODY MASS INDEX: 36.71 KG/M2 | DIASTOLIC BLOOD PRESSURE: 82 MMHG | HEIGHT: 66 IN

## 2023-05-23 DIAGNOSIS — I71.21 ANEURYSM OF ASCENDING AORTA WITHOUT RUPTURE (HCC): ICD-10-CM

## 2023-05-23 PROCEDURE — 99205 OFFICE O/P NEW HI 60 MIN: CPT | Performed by: THORACIC SURGERY (CARDIOTHORACIC VASCULAR SURGERY)

## 2023-05-23 PROCEDURE — 3079F DIAST BP 80-89 MM HG: CPT | Performed by: THORACIC SURGERY (CARDIOTHORACIC VASCULAR SURGERY)

## 2023-05-23 PROCEDURE — 3077F SYST BP >= 140 MM HG: CPT | Performed by: THORACIC SURGERY (CARDIOTHORACIC VASCULAR SURGERY)

## 2023-05-23 RX ORDER — LOSARTAN POTASSIUM 25 MG/1
TABLET ORAL
Qty: 30 TABLET | Refills: 0 | Status: SHIPPED | OUTPATIENT
Start: 2023-05-23 | End: 2023-06-06

## 2023-05-23 ASSESSMENT — ENCOUNTER SYMPTOMS
PND: 0
SPEECH CHANGE: 0
ABDOMINAL PAIN: 0
COUGH: 0
EYE PAIN: 0
DIZZINESS: 0
SEIZURES: 0
BRUISES/BLEEDS EASILY: 0
SHORTNESS OF BREATH: 1
HALLUCINATIONS: 0
WEIGHT LOSS: 0
BLOOD IN STOOL: 0
PHOTOPHOBIA: 0
MUSCULOSKELETAL NEGATIVE: 1
FOCAL WEAKNESS: 0
VOMITING: 0
FEVER: 0
HEADACHES: 0
DOUBLE VISION: 0
HEMOPTYSIS: 0
ORTHOPNEA: 0
NAUSEA: 0
CHILLS: 0
PALPITATIONS: 0
MEMORY LOSS: 0
TREMORS: 0
POLYDIPSIA: 0

## 2023-05-26 ENCOUNTER — HOSPITAL ENCOUNTER (OUTPATIENT)
Dept: RADIOLOGY | Facility: MEDICAL CENTER | Age: 53
End: 2023-05-26
Attending: STUDENT IN AN ORGANIZED HEALTH CARE EDUCATION/TRAINING PROGRAM
Payer: COMMERCIAL

## 2023-05-26 ENCOUNTER — DOCUMENTATION (OUTPATIENT)
Dept: VASCULAR LAB | Facility: MEDICAL CENTER | Age: 53
End: 2023-05-26

## 2023-05-26 DIAGNOSIS — Z12.31 ENCOUNTER FOR SCREENING MAMMOGRAM FOR BREAST CANCER: ICD-10-CM

## 2023-05-26 PROCEDURE — 77063 BREAST TOMOSYNTHESIS BI: CPT

## 2023-05-29 NOTE — RESULT ENCOUNTER NOTE
Radha Raphael,     Would you please give this patient a call and let her know her mammogram was normal and her next one will be due in 1-2 years     Thank you,  Renae

## 2023-06-06 ENCOUNTER — OFFICE VISIT (OUTPATIENT)
Dept: MEDICAL GROUP | Facility: CLINIC | Age: 53
End: 2023-06-06
Payer: COMMERCIAL

## 2023-06-06 VITALS
HEIGHT: 66 IN | DIASTOLIC BLOOD PRESSURE: 93 MMHG | SYSTOLIC BLOOD PRESSURE: 143 MMHG | BODY MASS INDEX: 35.84 KG/M2 | HEART RATE: 76 BPM | WEIGHT: 223 LBS | TEMPERATURE: 97.6 F | OXYGEN SATURATION: 97 %

## 2023-06-06 DIAGNOSIS — F41.9 ANXIETY: ICD-10-CM

## 2023-06-06 DIAGNOSIS — I71.21 ANEURYSM OF ASCENDING AORTA WITHOUT RUPTURE (HCC): ICD-10-CM

## 2023-06-06 DIAGNOSIS — F17.200 SMOKING: ICD-10-CM

## 2023-06-06 DIAGNOSIS — I10 PRIMARY HYPERTENSION: ICD-10-CM

## 2023-06-06 DIAGNOSIS — F17.210 SMOKES WITH GREATER THAN 40 PACK YEAR HISTORY: ICD-10-CM

## 2023-06-06 DIAGNOSIS — I71.40 ABDOMINAL AORTIC ANEURYSM (AAA) WITHOUT RUPTURE, UNSPECIFIED PART (HCC): ICD-10-CM

## 2023-06-06 PROCEDURE — 99213 OFFICE O/P EST LOW 20 MIN: CPT | Mod: GE | Performed by: STUDENT IN AN ORGANIZED HEALTH CARE EDUCATION/TRAINING PROGRAM

## 2023-06-06 PROCEDURE — 3077F SYST BP >= 140 MM HG: CPT | Mod: GC | Performed by: STUDENT IN AN ORGANIZED HEALTH CARE EDUCATION/TRAINING PROGRAM

## 2023-06-06 PROCEDURE — 3080F DIAST BP >= 90 MM HG: CPT | Mod: GC | Performed by: STUDENT IN AN ORGANIZED HEALTH CARE EDUCATION/TRAINING PROGRAM

## 2023-06-06 RX ORDER — LOSARTAN POTASSIUM 50 MG/1
50 TABLET ORAL DAILY
Qty: 90 TABLET | Refills: 1 | Status: SHIPPED | OUTPATIENT
Start: 2023-06-06 | End: 2023-09-11

## 2023-06-06 RX ORDER — BUPROPION HYDROCHLORIDE 150 MG/1
150 TABLET ORAL EVERY MORNING
Qty: 30 TABLET | Refills: 3 | Status: SHIPPED | OUTPATIENT
Start: 2023-06-06 | End: 2023-09-11

## 2023-06-06 RX ORDER — AMLODIPINE BESYLATE 5 MG/1
5 TABLET ORAL DAILY
Qty: 90 TABLET | Refills: 0 | Status: SHIPPED | OUTPATIENT
Start: 2023-06-06 | End: 2023-09-04

## 2023-06-06 NOTE — PROGRESS NOTES
Subjective:     CC: HTN, AAA f/u, Smoking cessation    HPI:   Thelma presents today with:     Problem   Htn (Hypertension)    Poorly controlled HTN for many years. + sequelae: MI x 3 and AAA.     Unfortunately she is suffering from occipital head aches described as pressure to the back of the head with associated blurred vision and generalized fog.     Last labs 03/2023 with hypokalemia, no elevation in Cr or BUN        Aaa (Abdominal Aortic Aneurysm) (Hcc)    tobacco abuse, ascending aortic aneurysm, abdominal aortic aneurysm, previous MI x 3     ascending aortic aneurysm with measurements of 4.6 x 4.7 cm.  She denies chest pain, dizziness, syncope. + daily head aches related to her uncontrolled HTN    Cardiovascular medicine recommends: titration to goal SBP ~120 or lower if tolerated and additional anti-impulse therapy with beta blocker for goal heart rate <70 or lower if tolerated.    Home BP readings have been approximately in the 160's systolic 110's diastolic. Taking her losartan 25 mg without missing doses       Smokes With Greater Than 40 Pack Year History    30 cigarettes per day now down to 8/9 per day. Very great success for her and she is presently very motivated to quit. Mildly benefited from use of lozenges. Interested in medication for smoking cessation and depression           Current Outpatient Medications Ordered in Epic   Medication Sig Dispense Refill    losartan (COZAAR) 25 MG Tab TAKE 1 TABLET BY MOUTH EVERY DAY 30 Tablet 0    atorvastatin (LIPITOR) 40 MG Tab Take 1 Tablet by mouth every evening. 90 Tablet 1    aspirin 81 MG EC tablet Take 1 Tablet by mouth every day. 90 Tablet 3    Blood Pressure Monitoring (COMFORT TOUCH BP CUFF/MEDIUM) Misc 1 Each every day. 1 Each 3    FLUoxetine (PROZAC) 10 MG Cap Take 1 Capsule by mouth every day. 30 Capsule 0    albuterol 108 (90 Base) MCG/ACT Aero Soln inhalation aerosol Inhale 2 Puffs every four hours as needed for Shortness of Breath. 1 Each 0     "buPROPion (WELLBUTRIN XL) 150 MG XL tablet Take 1 Tablet by mouth every morning. (Patient not taking: Reported on 4/25/2023) 30 Tablet 0     No current Epic-ordered facility-administered medications on file.     ROS:  Gen: no fevers/chills, no changes in weight  Eyes: no changes in vision  ENT: no sore throat, no hearing loss, no bloody nose  Pulm: no sob, no cough  CV: no chest pain, no palpitations  GI: no nausea/vomiting, no diarrhea  : no dysuria  MSk: no myalgias  Skin: no rash  Neuro: no headaches, no numbness/tingling  Heme/Lymph: no easy bruising      Objective:     Exam:  BP (!) 143/93 Comment: sitting  Pulse 76   Temp 36.4 °C (97.6 °F)   Ht 1.676 m (5' 6\")   Wt 101 kg (223 lb)   SpO2 97%   BMI 35.99 kg/m²  Body mass index is 35.99 kg/m².    Gen: Alert and oriented, No apparent distress.  Neck: Neck is supple without lymphadenopathy.  Lungs: Normal effort, CTA bilaterally, no wheezes, rhonchi, or rales  CV: Regular rate and rhythm. No murmurs, rubs, or gallops.  Ext: No clubbing, cyanosis, edema.      Labs:    Latest Reference Range & Units Most Recent   Sodium 136 - 145 mmol/L 143 (E)  3/4/23 14:11   Potassium 3.5 - 5.3 mmol/L 3.0 (L) (E)  3/4/23 14:11   Chloride 98 - 110 mmol/L 107 (E)  3/4/23 14:11   Co2 20.0 - 31.0 mmol/L 26.0 (E)  3/4/23 14:11   Anion Gap 6 - 19 mmol/L 10.0 (E)  3/4/23 14:11   Bun 6.0 - 24.0 mg/dL 7.0 (E)  3/4/23 14:11   Creatinine 0.5 - 1.0 mg/dL 0.8 (E)  3/4/23 14:11   Bun-Creatinine Ratio 10.0 - 20.0  8.8 (L) (E)  3/4/23 14:11   Calcium 8.6 - 10.4 mg/dL 9.1 (E)  3/4/23 14:11   AST(SGOT) 10 - 36 U/L 21 (E)  3/4/23 14:11   ALT(SGPT) 6 - 29 U/L 16 (E)  3/4/23 14:11   Alkaline Phosphatase 33 - 130 U/L 88 (E)  3/4/23 14:11   Total Bilirubin 0.2 - 1.2 mg/dL 0.4 (E)  3/4/23 14:11   Albumin 3.6 - 5.1 g/dL 4.0 (E)  3/4/23 14:11   Total Protein 6.4 - 8.4 g/dL 6.5 (E)  3/4/23 14:11   Glycohemoglobin 4.0 - 5.6 % 5.4  4/24/23 08:58   Estim. Avg Glu mg/dL 108  4/24/23 08:58   Fasting " Status  Fasting  4/24/23 08:58   Cholesterol,Tot 100 - 199 mg/dL 179  4/24/23 08:58   Triglycerides 0 - 149 mg/dL 125  4/24/23 08:58   HDL >=40 mg/dL 51  4/24/23 08:58   LDL <100 mg/dL 103 (H)  4/24/23 08:58   TSH 0.380 - 5.330 uIU/mL 1.050  4/24/23 08:58   (L): Data is abnormally low  (H): Data is abnormally high  (E): External lab result    Assessment & Plan:     53 y.o. female with the following -     Problem List Items Addressed This Visit       AAA (abdominal aortic aneurysm) (HCC)     Cardiovascular medicine recommends: titration to goal SBP ~120 or lower if tolerated and additional anti-impulse therapy with beta blocker for goal heart rate <70 or lower if tolerated. Re-iterated these goals during todays visit     Increase losartan to 50 mg daily, add amlodipine 5 mg  Continue home log, RTC in 2 weeks         Relevant Medications    losartan (COZAAR) 50 MG Tab    amLODIPine (NORVASC) 5 MG Tab    Ascending aortic aneurysm (HCC)    Relevant Medications    losartan (COZAAR) 50 MG Tab    amLODIPine (NORVASC) 5 MG Tab    Smokes with greater than 40 pack year history     Initiate Wellbutrin 150 mg daily, Referral to smoking cessation program through [facility]   Discussed side effects and time to efficacy.   RTC in 2-3 weeks to discuss efficacy            Relevant Orders    Referral to Tobacco Cessation Program    HTN (hypertension)     Increase losartan to 50 mg daily, add amlodipine 5 mg   CMP repeat  Urine pr:cr ratio           Relevant Medications    losartan (COZAAR) 50 MG Tab    amLODIPine (NORVASC) 5 MG Tab    Other Relevant Orders    PROTEIN/CREAT RATIO URINE    Comp Metabolic Panel     Other Visit Diagnoses       Smoking        Relevant Medications    buPROPion (WELLBUTRIN XL) 150 MG XL tablet    Anxiety        Relevant Medications    buPROPion (WELLBUTRIN XL) 150 MG XL tablet

## 2023-06-06 NOTE — ASSESSMENT & PLAN NOTE
Initiate Wellbutrin 150 mg daily, Referral to smoking cessation program through [facility]   Discussed side effects and time to efficacy.   RTC in 2-3 weeks to discuss efficacy

## 2023-06-06 NOTE — ASSESSMENT & PLAN NOTE
Cardiovascular medicine recommends: titration to goal SBP ~120 or lower if tolerated and additional anti-impulse therapy with beta blocker for goal heart rate <70 or lower if tolerated. Re-iterated these goals during todays visit     Increase losartan to 50 mg daily, add amlodipine 5 mg  Continue home log, RTC in 2 weeks

## 2023-06-08 ENCOUNTER — TELEPHONE (OUTPATIENT)
Dept: VASCULAR LAB | Facility: MEDICAL CENTER | Age: 53
End: 2023-06-08

## 2023-06-08 NOTE — TELEPHONE ENCOUNTER
[facility]    Received pharmacotherapy referral for tobacco cessation from Dr. Kahn on 6/6/23    Scheduled pt for initial visit on 6/14    Insurance: [employer]  PCP: [facility]  Locations to be seen: [facility] at [phone], fax [phone]    Osvaldo Reed, JakeD, BCACP

## 2023-06-12 ENCOUNTER — TELEPHONE (OUTPATIENT)
Dept: MEDICAL GROUP | Facility: CLINIC | Age: 53
End: 2023-06-12

## 2023-06-12 NOTE — TELEPHONE ENCOUNTER
----- Message from Renae Kahn M.D. sent at 5/29/2023 10:09 AM PDT -----  Radha Raphael,     Would you please give this patient a call and let her know her mammogram was normal and her next one will be due in 1-2 years     Thank you,  Renae

## 2023-06-14 ENCOUNTER — NON-PROVIDER VISIT (OUTPATIENT)
Dept: VASCULAR LAB | Facility: MEDICAL CENTER | Age: 53
End: 2023-06-14
Attending: INTERNAL MEDICINE
Payer: COMMERCIAL

## 2023-06-14 ENCOUNTER — PATIENT MESSAGE (OUTPATIENT)
Dept: HEALTH INFORMATION MANAGEMENT | Facility: OTHER | Age: 53
End: 2023-06-14

## 2023-06-14 VITALS — DIASTOLIC BLOOD PRESSURE: 87 MMHG | SYSTOLIC BLOOD PRESSURE: 131 MMHG | HEART RATE: 69 BPM

## 2023-06-14 DIAGNOSIS — Z72.0 TOBACCO ABUSE: ICD-10-CM

## 2023-06-14 PROCEDURE — 99212 OFFICE O/P EST SF 10 MIN: CPT

## 2023-06-14 PROCEDURE — 99407 BEHAV CHNG SMOKING > 10 MIN: CPT

## 2023-06-14 RX ORDER — VARENICLINE TARTRATE 0.5 (11)-1
1 KIT ORAL DAILY
Qty: 53 EACH | Refills: 0 | Status: SHIPPED | OUTPATIENT
Start: 2023-06-14 | End: 2023-09-11

## 2023-07-27 ENCOUNTER — TELEPHONE (OUTPATIENT)
Dept: VASCULAR LAB | Facility: MEDICAL CENTER | Age: 53
End: 2023-07-27

## 2023-07-27 NOTE — TELEPHONE ENCOUNTER
Called pt regarding missed pharmacotherapy appt - no answer. LVM asking pt to c/b to reschedule.     Will f/u at a later time.    Osvaldo Reed, JakeD, BCACP

## 2023-08-03 ENCOUNTER — TELEPHONE (OUTPATIENT)
Dept: VASCULAR LAB | Facility: MEDICAL CENTER | Age: 53
End: 2023-08-03

## 2023-08-03 ENCOUNTER — TELEPHONE (OUTPATIENT)
Dept: MEDICAL GROUP | Facility: CLINIC | Age: 53
End: 2023-08-03

## 2023-08-03 ENCOUNTER — APPOINTMENT (OUTPATIENT)
Dept: CARDIOLOGY | Facility: MEDICAL CENTER | Age: 53
End: 2023-08-03
Payer: COMMERCIAL

## 2023-08-03 NOTE — TELEPHONE ENCOUNTER
Transthoracic echocardiogram was denied by patients insurance. Insurance is requesting a peer to peer for approval.    [phone]

## 2023-08-03 NOTE — TELEPHONE ENCOUNTER
Called pt regarding missed pharmacotherapy appt - no answer. LVM asking pt to c/b to reschedule.      2nd call.    Will f/u at a later time.     Osvaldo Reed, JakeD, BCACP

## 2023-08-10 ENCOUNTER — TELEPHONE (OUTPATIENT)
Dept: VASCULAR LAB | Facility: MEDICAL CENTER | Age: 53
End: 2023-08-10

## 2023-08-10 NOTE — LETTER
[address]  [facility] [address]        Dear Thelma Segovia ,    We have been unsuccessful in our attempts to contact you regarding your Clinical Pharmacist follow up.  Please contact us if you would like to schedule an appointment for help managing your tobacco cessation.    Please contact our clinic so we may assist you.  We are open Monday-Friday 8 am until 5 pm.  You may reach our Service at [phone].        Sincerely,    Randal Bullock, JakeD, [facility]  Clinic Supervisor  [facility]  Outpatient Anticoagulation Service

## 2023-08-10 NOTE — TELEPHONE ENCOUNTER
Called pt regarding missed pharmacotherapy appt - no answer. LVM asking pt to c/b to reschedule.      3rd call.     Sent letter.    Will await pt contact.     Osvaldo Reed, JakeD, BCACP

## 2023-09-05 RX ORDER — AMLODIPINE BESYLATE 5 MG/1
5 TABLET ORAL DAILY
COMMUNITY
End: 2023-09-05 | Stop reason: SDUPTHER

## 2023-09-06 RX ORDER — AMLODIPINE BESYLATE 5 MG/1
5 TABLET ORAL DAILY
Qty: 30 TABLET | Refills: 3 | Status: SHIPPED | OUTPATIENT
Start: 2023-09-06 | End: 2023-11-29 | Stop reason: SDUPTHER

## 2023-09-11 ENCOUNTER — OFFICE VISIT (OUTPATIENT)
Dept: MEDICAL GROUP | Facility: CLINIC | Age: 53
End: 2023-09-11
Payer: COMMERCIAL

## 2023-09-11 VITALS
SYSTOLIC BLOOD PRESSURE: 154 MMHG | OXYGEN SATURATION: 94 % | BODY MASS INDEX: 34.72 KG/M2 | HEIGHT: 66 IN | TEMPERATURE: 97.8 F | DIASTOLIC BLOOD PRESSURE: 100 MMHG | WEIGHT: 216 LBS | HEART RATE: 80 BPM

## 2023-09-11 DIAGNOSIS — Z72.0 TOBACCO USE: ICD-10-CM

## 2023-09-11 DIAGNOSIS — R06.02 SHORTNESS OF BREATH: ICD-10-CM

## 2023-09-11 DIAGNOSIS — I10 PRIMARY HYPERTENSION: ICD-10-CM

## 2023-09-11 DIAGNOSIS — R63.4 ABNORMAL WEIGHT LOSS: ICD-10-CM

## 2023-09-11 PROCEDURE — 3077F SYST BP >= 140 MM HG: CPT | Mod: GC

## 2023-09-11 PROCEDURE — 99214 OFFICE O/P EST MOD 30 MIN: CPT | Mod: GC

## 2023-09-11 PROCEDURE — 3080F DIAST BP >= 90 MM HG: CPT | Mod: GC

## 2023-09-11 RX ORDER — IPRATROPIUM BROMIDE 17 UG/1
2 AEROSOL, METERED RESPIRATORY (INHALATION) 2 TIMES DAILY
Qty: 12 G | Refills: 0 | Status: SHIPPED | OUTPATIENT
Start: 2023-09-11 | End: 2023-10-30

## 2023-09-11 RX ORDER — LOSARTAN POTASSIUM 100 MG/1
100 TABLET ORAL DAILY
Qty: 90 TABLET | Refills: 1 | Status: SHIPPED | OUTPATIENT
Start: 2023-09-11 | End: 2024-03-09

## 2023-09-11 RX ORDER — ALBUTEROL SULFATE 90 UG/1
2 AEROSOL, METERED RESPIRATORY (INHALATION) EVERY 4 HOURS PRN
Qty: 1 EACH | Refills: 0 | Status: SHIPPED | OUTPATIENT
Start: 2023-09-11 | End: 2023-09-25

## 2023-09-11 NOTE — ASSESSMENT & PLAN NOTE
Chronic. Uncontrolled. Options for treatment discussed. In shared decision making:   -Increase losartan to 100mg daily   -continue amlodipine   -BP daily at home and record   -Follow in 1 month on BP

## 2023-09-11 NOTE — PATIENT INSTRUCTIONS
-GI for colonoscopy   -New inhaler take twice daily  -Albuterol sent as needed   -Increased Losartan take daily   -Take BP daily   -Nicotine patches sent   -Return in 1 month for follow-up

## 2023-09-11 NOTE — ASSESSMENT & PLAN NOTE
Chronic. Uncontrolled. Discussed options for treatment. Likely COPD influence.   -Start Atrovent, instructed on use   -Albuterol PRN   -Follow in 1 month on symptoms, to keep symptom journal with albuterol frequency

## 2023-09-11 NOTE — PROGRESS NOTES
"Subjective:     CC:   Chief Complaint   Patient presents with    Fatigue    Emesis    Medication Refill        HPI:   Thelma presents today with    Problem   Tobacco Use    Patient with 40 year 1.5 pack year history. Has decreased to 5 cigarettes a day. States this is how she shaun with stressors. Recent increase in stressors. Has tried Wellbutrin and Varenicline but the former was not well-tolerated and the latter was too expensive. Open to other options.      Shortness of Breath    Patient with worsening SOB. States it is with exertion. Has been using albuterol 3x a day which helps. Never done PFTs formally. No chest pain, dizziness, lower extremity swelling, SOB with laying down, increased WOB, cough, fever, chills, chest pain.      Abnormal Weight Loss    Patient reports loss of 8 pounds over the last month. Also with associated vomiting on and off. States 10 days of the past month with vomiting. 1-4 episodes of vomiting on these days. Will have small streak of blood in vomit on 3rd or 4th episode if she vomits that much in a day.     Drinks 1/2 pint of Fireball on Fridays before karaoke. Denies alcohol use otherwise.     No blood (red or black) per rectum, constipation, diarrhea. Denies abdominal pain. States she has had no change in diet, change in caliber of stool, sensation of food stuck in throat, pain with swallowing.       Htn (Hypertension)    Poorly controlled HTN for many years. History of MI x 3 and AAA.     Patient with BP at home ranging 140-160s/100s. No headache, chest pain, heart palpitations, weakness, dizziness, change of speech, change of balance.          ROS: See HPI.     Objective:     Exam:  BP (!) 154/100 (BP Location: Right arm, Patient Position: Sitting, BP Cuff Size: Adult)   Pulse 80   Temp 36.6 °C (97.8 °F) (Temporal)   Ht 1.676 m (5' 6\")   Wt 98 kg (216 lb)   SpO2 94%   BMI 34.86 kg/m²  Body mass index is 34.86 kg/m².    Physical Exam:  General: Pt resting in NAD, cooperative "   Skin:  No cyanosis or jaundice   HEENT: NC/AT. EOMI. No conjunctival injection or sclera icterus.   Lungs:  Good air movement. Coarse without wheezes. Non-labored.   Cardiovascular:  S1/S2 RRR   Abdomen:  Abdomen is soft, +TTP LUQ without rebound or guarding, non-distended, +BS  Extremities:  No LE edema   CNS:  No gross focal neurologic deficits  Psych: Appropriate mood and affect    Labs: Reviewed    Assessment & Plan:     53 y.o. female with the following -     Problem List Items Addressed This Visit       HTN (hypertension)     Chronic. Uncontrolled. Options for treatment discussed. In shared decision making:   -Increase losartan to 100mg daily   -continue amlodipine   -BP daily at home and record   -Follow in 1 month on BP          Relevant Medications    losartan (COZAAR) 100 MG Tab    Tobacco use     Chronic. Uncontrolled. Discussed options for treatment. Shared decision making:   -Patches  -Stress management discussed, will further discuss stressors at follow-up appointment  -Referral to lung cancer screening program          Relevant Medications    nicotine (NICODERM) 7 MG/24HR PATCH 24 HR    Shortness of breath     Chronic. Uncontrolled. Discussed options for treatment. Likely COPD influence.   -Start Atrovent, instructed on use   -Albuterol PRN   -Follow in 1 month on symptoms, to keep symptom journal with albuterol frequency          Relevant Medications    ipratropium (ATROVENT HFA) 17 MCG/ACT Aero Soln    albuterol 108 (90 Base) MCG/ACT Aero Soln inhalation aerosol    Other Relevant Orders    REFERRAL TO LUNG CANCER SCREENING PROGRAM    Abnormal weight loss     Acute. Uncontrolled. Patient with 8 pound unintentional weight loss in 1 month. Blood after multiple episodes of vomiting likely teri-paiz. Mild LUQ TTP. Potentially 2/2 malignancy vs gastritis vs PUD vs stress. Unlikely lung malignancy given recent CT PE without nodule.   -Referral to GI for colonoscopy/endoscopy consideration  -Referral  to lung cancer screening program   -ED and return precautions discussed     Follow after GI          Relevant Orders    Referral to GI for Colonoscopy       Follow-up after colonoscopy and in 1 month for BP. Return sooner with new or worsening conditions.

## 2023-09-11 NOTE — ASSESSMENT & PLAN NOTE
Chronic. Uncontrolled. Discussed options for treatment. Shared decision making:   -Patches  -Stress management discussed, will further discuss stressors at follow-up appointment  -Referral to lung cancer screening program

## 2023-09-11 NOTE — ASSESSMENT & PLAN NOTE
Acute. Uncontrolled. Patient with 8 pound unintentional weight loss in 1 month. Blood after multiple episodes of vomiting likely teri-paiz. Mild LUQ TTP. Potentially 2/2 malignancy vs gastritis vs PUD vs stress. Unlikely lung malignancy given recent CT PE without nodule.   -Referral to GI for colonoscopy/endoscopy consideration  -Referral to lung cancer screening program   -ED and return precautions discussed     Follow after GI

## 2023-09-25 DIAGNOSIS — R06.02 SHORTNESS OF BREATH: ICD-10-CM

## 2023-09-25 RX ORDER — ALBUTEROL SULFATE 90 UG/1
2 AEROSOL, METERED RESPIRATORY (INHALATION) EVERY 4 HOURS PRN
Qty: 8.5 G | Refills: 0 | Status: SHIPPED | OUTPATIENT
Start: 2023-09-25

## 2023-10-23 ENCOUNTER — OFFICE VISIT (OUTPATIENT)
Dept: VASCULAR LAB | Facility: MEDICAL CENTER | Age: 53
End: 2023-10-23
Attending: INTERNAL MEDICINE
Payer: MEDICAID

## 2023-10-23 VITALS
HEIGHT: 66 IN | SYSTOLIC BLOOD PRESSURE: 137 MMHG | WEIGHT: 214 LBS | DIASTOLIC BLOOD PRESSURE: 88 MMHG | BODY MASS INDEX: 34.39 KG/M2 | HEART RATE: 94 BPM

## 2023-10-23 DIAGNOSIS — I71.21 ANEURYSM OF ASCENDING AORTA WITHOUT RUPTURE (HCC): ICD-10-CM

## 2023-10-23 DIAGNOSIS — I10 PRIMARY HYPERTENSION: ICD-10-CM

## 2023-10-23 DIAGNOSIS — E78.5 DYSLIPIDEMIA: ICD-10-CM

## 2023-10-23 DIAGNOSIS — F17.210 SMOKES WITH GREATER THAN 40 PACK YEAR HISTORY: ICD-10-CM

## 2023-10-23 DIAGNOSIS — Z72.0 TOBACCO ABUSE: ICD-10-CM

## 2023-10-23 PROBLEM — I71.40 AAA (ABDOMINAL AORTIC ANEURYSM) (HCC): Status: RESOLVED | Noted: 2023-04-25 | Resolved: 2023-10-23

## 2023-10-23 PROCEDURE — 99212 OFFICE O/P EST SF 10 MIN: CPT

## 2023-10-23 PROCEDURE — 3075F SYST BP GE 130 - 139MM HG: CPT | Performed by: INTERNAL MEDICINE

## 2023-10-23 PROCEDURE — 3079F DIAST BP 80-89 MM HG: CPT | Performed by: INTERNAL MEDICINE

## 2023-10-23 PROCEDURE — 99204 OFFICE O/P NEW MOD 45 MIN: CPT | Performed by: INTERNAL MEDICINE

## 2023-10-23 NOTE — PROGRESS NOTES
VASCULAR MEDICINE CLINIC - INITIAL VISIT  10/23/23     Thelma Segovia is a 53 y.o.  male who presents today  for   Chief Complaint   Patient presents with    Follow-Up      Subjective      HPI:  Patient referred for evaluation and management of Ascending TAA.   Was found incidently on CT scan  No chest pain  Long h/o smoking  Down from 2ppd to 1/2 ppd  Planning to get patches  Started on atorvastatin after her last blood test.  No myalgias  Had not been taking her blood pressure medications until a couple weeks ago.  Now that she been taking her blood pressures coming down at home.  Usually around low 130s over low 80s  Never been on oral hypoglycemics  Does have a history of lupus  No family history of aneurysm  States that she has had 3 heart attacks in the past but no previous intervention  Takes aspirin every day  Denies stimulant use      Past Medical History:   Diagnosis Date    COPD (chronic obstructive pulmonary disease) ([facility])     Hypertension     Lupus (HCC)     MI (myocardial infarction) ([facility]) 2007    Dyslipidemia  MI   TAA    Past Surgical History:   Procedure Laterality Date    HYSTERECTOMY LAPAROSCOPY      HYSTERECTOMY, TOTAL ABDOMINAL      [facility] C-SEC ONLY,PREV C-SEC        Family History -   No known CAD   No known aneurysm    Social History     Tobacco Use    Smoking status: Every Day     Current packs/day: 1.50     Average packs/day: 1.5 packs/day for 40.0 years (60.0 ttl pk-yrs)     Types: Cigarettes    Smokeless tobacco: Never   Substance Use Topics    Alcohol use: No    Drug use: No        Current Outpatient Medications on File Prior to Visit   Medication Sig Dispense Refill    albuterol 108 (90 Base) MCG/ACT Aero Soln inhalation aerosol INHALE 2 PUFFS BY MOUTH EVERY 4 HOURS AS NEEDED FOR SHORTNESS OF BREATH 8.5 g 0    ipratropium (ATROVENT HFA) 17 MCG/ACT Aero Soln Inhale 2 Puffs 2 times a day. 12 g 0    losartan (COZAAR) 100 MG Tab Take 1 Tablet by mouth every day for 180 days. 90 Tablet 1     "amLODIPine (NORVASC) 5 MG Tab Take 1 Tablet by mouth every day. 30 Tablet 3    atorvastatin (LIPITOR) 40 MG Tab Take 1 Tablet by mouth every evening. 90 Tablet 1    aspirin 81 MG EC tablet Take 1 Tablet by mouth every day. 90 Tablet 3    Blood Pressure Monitoring (COMFORT TOUCH BP CUFF/MEDIUM) Misc 1 Each every day. 1 Each 3    nicotine (NICODERM) 7 MG/24HR PATCH 24 HR Place 1 Patch on the skin every 24 hours. 30 Patch 0     No current facility-administered medications on file prior to visit.        ALLERGIES  Pcn [penicillins]     DIET AND EXERCISE:  Weight Change:down about 10 pounds  Diet: trying to eat less fried foods and sugars  Exercise: water fitness 2x weekly            Objective     Objective:     Vitals:    10/23/23 1410 10/23/23 1414   BP: (!) 148/91 137/88   BP Location: Left arm Left arm   Patient Position: Sitting Sitting   BP Cuff Size: Adult Adult   Pulse: 90 94   Weight: 97.1 kg (214 lb)    Height: 1.676 m (5' 6\")         Physical Exam  Vitals reviewed.   Constitutional:       General: She is not in acute distress.     Appearance: She is not diaphoretic.   HENT:      Head: Normocephalic and atraumatic.   Eyes:      General: No scleral icterus.     Conjunctiva/sclera: Conjunctivae normal.   Neck:      Vascular: No carotid bruit.   Cardiovascular:      Rate and Rhythm: Normal rate and regular rhythm.      Heart sounds: Normal heart sounds. No murmur heard.  Pulmonary:      Effort: Pulmonary effort is normal. No respiratory distress.      Breath sounds: Normal breath sounds. No wheezing or rales.   Musculoskeletal:      Right lower leg: No edema.      Left lower leg: No edema.   Skin:     Coloration: Skin is not pale.   Neurological:      General: No focal deficit present.      Mental Status: She is alert and oriented to person, place, and time.      Cranial Nerves: No cranial nerve deficit.      Coordination: Coordination normal.      Gait: Gait is intact. Gait normal.   Psychiatric:         Mood and " "Affect: Mood and affect normal.         Behavior: Behavior normal.          DATA REVIEW    Lab Results   Component Value Date/Time    CHOLSTRLTOT 179 04/24/2023 08:58 AM     (H) 04/24/2023 08:58 AM    HDL 51 04/24/2023 08:58 AM    TRIGLYCERIDE 125 04/24/2023 08:58 AM       Lab Results   Component Value Date/Time    SODIUM 143 03/04/2023 02:11 PM    SODIUM 136 01/23/2016 05:05 AM    POTASSIUM 3.0 (L) 03/04/2023 02:11 PM    POTASSIUM 3.5 (L) 01/23/2016 05:05 AM    CHLORIDE 107 03/04/2023 02:11 PM    CHLORIDE 106 01/23/2016 05:05 AM    CO2 26.0 03/04/2023 02:11 PM    CO2 19 (L) 01/23/2016 05:05 AM    GLUCOSE 118 03/04/2023 02:11 PM    GLUCOSE 111 (H) 01/23/2016 05:05 AM    BUN 7.0 03/04/2023 02:11 PM    BUN 9 01/23/2016 05:05 AM    CREATININE 0.8 03/04/2023 02:11 PM    CREATININE 0.98 01/23/2016 05:05 AM    BUNCREATRAT 8.8 (L) 03/04/2023 02:11 PM     Lab Results   Component Value Date/Time    ALKPHOSPHAT 88 03/04/2023 02:11 PM    ALKPHOSPHAT 78 01/23/2016 05:05 AM    ASTSGOT 21 03/04/2023 02:11 PM    ASTSGOT 18 01/23/2016 05:05 AM    ALTSGPT 16 03/04/2023 02:11 PM    ALTSGPT 11 01/23/2016 05:05 AM    TBILIRUBIN 0.4 03/04/2023 02:11 PM    TBILIRUBIN 0.9 01/23/2016 05:05 AM       Lab Results   Component Value Date/Time    HBA1C 5.4 04/24/2023 08:58 AM       No results found for: \"MICROALBCALC\", \"MALBCRT\", \"MALBEXCR\", \"DSEYAG63\", \"MICROALBUR\", \"MICRALB\", \"UMICROALBUM\", \"MICROALBTIM\"      CTA March 2023  Aortic arch vessels demonstrate origin of the left common carotid artery from the right artery consistent with a bovine type arch.  Ascending aorta is aneurysmal and measures 46 mm by 47 mm AP.  At the level of aortic valve the diameter is 38 mm.  Aortic arch is 28 mm in diameter.  Ascending thoracic aorta is normal in 29 mm    Echocardiogram April 2023  No prior study is available for comparison.   The left ventricular ejection fraction is visually estimated to be 60%.  Normal regional wall motion.  The ascending " aorta is borderline dilated with a diameter of 4 cm.  Structurally normal aortic valve without significant stenosis or   regurgitation.      Medical Decision Making:  Today's Assessment / Status / Plan:     1. Aneurysm of ascending aorta without rupture (HCC)             Patient Type: Secondary Prevention    Etiology of Established CVD if Present:     1) Ascending TAA -does not appear to be associated with BAV.  Does not appear to be familial syndrome.  Likely multifactorial.  Discussed the need for lifelong imaging and risk factor modification  -Repeat CTA March 2024 and plan further surveillance based upon results  -Avoid all stimulants-  -Lifestyle modification and medical management as per below    2.  CAD with reported history of MI x3 but no known previous intervention -currently asymptomatic.  Continue lifestyle modification and medical management as per below    Lipid Management: Qualifies for Statin Therapy Based on 2018 ACC/AHA Guidelines: yes  Calculated 10-Year Risk of ASCVD: N/A  Currently on Statin: Yes  Goal LDL less than 70 non-HDL less than 100  Unclear level of control since starting atorvastatin  Plan:  -Continue atorvastatin 40 mg a day for now  -Repeat fasting lipid panel and lipoprotein a-  -consider intensification of therapy depending upon results    Blood Pressure Management:  Acc[place] (2017) Blood Pressure Goal <130/80  Suboptimally controlled both at home and in the office  Recently started back on her medications  She does have considerable COPD and since we are not sure if there is a reversible component we may want to avoid beta blockade unless we know her aneurysm is increasing in size  Plan:  -Continue losartan 100 mg daily which may also have benefits over and above blood pressure control and reducing aneurysm expansion  -Continue amlodipine 5 mg a day  -Careful home blood pressure log  -Check GFR, electrolytes, urine for albumin  -Consider additional agent if remains above goal,  likely thiazide type diuretic depending upon electrolytes and renal function    Glycemic Status: Normal  Continue lifestyle modification    Anti-Platelet/Anti-Coagulant Tx: yes  Continue aspirin 81 mg a day    Smoking: Current everyday smoker  She has cut down from 2 packs to half a pack a day  She will pick a quit date on the day she gets her patches from the pharmacy  Instructed to put a patch on every day in the morning and take it off at night.  Do not smoke and use patches at the same time  Can use nicotine lozenge as needed  Continue to address at every visit    Physical Activity: Continue much improved exercise routine    Weight Management and Nutrition: She is lost 10 pounds.  She should continue with slow steady weight loss    Other:     -COPD -defer additional work-up and management to PCP and/or pulmonary    -Lupus -unclear how diagnosis was made.  Await urine for protein and albumin.  Otherwise defer further work-up and management to PCP    Instructed to follow-up with PCP for remainder of adult medical needs: yes  We will partner with other providers in the management of established vascular disease and cardiometabolic risk factors.    Studies to Be Obtained: CTA chest March 2024  Labs to Be Obtained: As above prior to next visit    Follow up in: 4 weeks    Michael J Bloch, M.D.     Cc:   PRABHAKAR Loo

## 2023-10-29 DIAGNOSIS — R06.02 SHORTNESS OF BREATH: ICD-10-CM

## 2023-10-30 RX ORDER — IPRATROPIUM BROMIDE 17 UG/1
2 AEROSOL, METERED RESPIRATORY (INHALATION) 2 TIMES DAILY
Qty: 12.9 G | Refills: 0 | Status: SHIPPED | OUTPATIENT
Start: 2023-10-30

## 2023-11-29 ENCOUNTER — OFFICE VISIT (OUTPATIENT)
Dept: VASCULAR LAB | Facility: MEDICAL CENTER | Age: 53
End: 2023-11-29
Payer: MEDICAID

## 2023-11-29 VITALS
HEART RATE: 84 BPM | WEIGHT: 209 LBS | SYSTOLIC BLOOD PRESSURE: 142 MMHG | HEIGHT: 66 IN | BODY MASS INDEX: 33.59 KG/M2 | DIASTOLIC BLOOD PRESSURE: 85 MMHG

## 2023-11-29 DIAGNOSIS — E78.5 DYSLIPIDEMIA: ICD-10-CM

## 2023-11-29 DIAGNOSIS — I10 PRIMARY HYPERTENSION: ICD-10-CM

## 2023-11-29 DIAGNOSIS — I71.21 ANEURYSM OF ASCENDING AORTA WITHOUT RUPTURE (HCC): ICD-10-CM

## 2023-11-29 DIAGNOSIS — F17.210 SMOKES WITH GREATER THAN 40 PACK YEAR HISTORY: ICD-10-CM

## 2023-11-29 DIAGNOSIS — Z72.0 TOBACCO USE: ICD-10-CM

## 2023-11-29 PROCEDURE — 99214 OFFICE O/P EST MOD 30 MIN: CPT

## 2023-11-29 PROCEDURE — 99406 BEHAV CHNG SMOKING 3-10 MIN: CPT

## 2023-11-29 PROCEDURE — 99212 OFFICE O/P EST SF 10 MIN: CPT

## 2023-11-29 PROCEDURE — 3079F DIAST BP 80-89 MM HG: CPT

## 2023-11-29 PROCEDURE — 3077F SYST BP >= 140 MM HG: CPT

## 2023-11-29 RX ORDER — AMLODIPINE BESYLATE 5 MG/1
10 TABLET ORAL DAILY
Qty: 60 TABLET | Refills: 2 | Status: SHIPPED | OUTPATIENT
Start: 2023-11-29

## 2023-11-29 NOTE — PROGRESS NOTES
VASCULAR MEDICINE CLINIC - Follow up VISIT  11/29/2023     Thelma Segovia is a 53 y.o.  male who presents today  for   Chief Complaint   Patient presents with    Follow-Up      Subjective      HPI:  Patient referred for evaluation and management of Ascending TAA.   Here for first follow up  Did not do labs  Did not bring in BP log (has not been writing down BPs)  Home bps reported as being 160-200/80-100s  Is having some HA and blurry vision with bps >200  Is not rechecking bps if elevated with sitting in same spot.  Typically gets up and is active/moving prior to rechecking.   Does note she has had increased home stressors since last appt  Denies TIA/CVA   Denies cp, palpitations or leg swelling  Has been having some SOB, also had cold earlier this month, + cough and +sputum, no fever.  Has not followed up with PCP  Continues to smoke 5-8 cigarettes/day  Has not followed up with smoking cessation pharmacotherapy  Needs refill for patches  Remains on amlodipine and losartan, no leg swelling  No myalgias on atorva  No bleeding on asa      Initial hx as reviewed prior:  Was found incidently on CT scan  No chest pain  Long h/o smoking  Down from 2ppd to 1/2 ppd  Planning to get patches  Started on atorvastatin after her last blood test.  No myalgias  Had not been taking her blood pressure medications until a couple weeks ago.  Now that she been taking her blood pressures coming down at home.  Usually around low 130s over low 80s  Never been on oral hypoglycemics  Does have a history of lupus  No family history of aneurysm  States that she has had 3 heart attacks in the past but no previous intervention  Takes aspirin every day  Denies stimulant use           Family History -   No known CAD   No known aneurysm    Social History     Tobacco Use    Smoking status: Every Day     Current packs/day: 1.50     Average packs/day: 1.5 packs/day for 40.0 years (60.0 ttl pk-yrs)     Types: Cigarettes    Smokeless tobacco:  "Never   Substance Use Topics    Alcohol use: No    Drug use: No        Current Outpatient Medications on File Prior to Visit   Medication Sig Dispense Refill    ATROVENT HFA 17 MCG/ACT Aero Soln INHALE 2 PUFFS BY MOUTH TWICE DAILY 12.9 g 0    albuterol 108 (90 Base) MCG/ACT Aero Soln inhalation aerosol INHALE 2 PUFFS BY MOUTH EVERY 4 HOURS AS NEEDED FOR SHORTNESS OF BREATH 8.5 g 0    losartan (COZAAR) 100 MG Tab Take 1 Tablet by mouth every day for 180 days. 90 Tablet 1    atorvastatin (LIPITOR) 40 MG Tab Take 1 Tablet by mouth every evening. 90 Tablet 1    aspirin 81 MG EC tablet Take 1 Tablet by mouth every day. 90 Tablet 3    Blood Pressure Monitoring (COMFORT TOUCH BP CUFF/MEDIUM) Misc 1 Each every day. 1 Each 3     No current facility-administered medications on file prior to visit.        ALLERGIES  Pcn [penicillins]     DIET AND EXERCISE:  Weight Change:down another 5 lbs, (15 total)  Diet: trying to eat less fried foods and sugars  Exercise: water fitness 2x weekly, less recently due to stressors            Objective     Objective:     Vitals:    11/29/23 1353 11/29/23 1357   BP: (!) 151/92 (!) 142/85   BP Location: Left arm Left arm   Patient Position: Sitting Sitting   BP Cuff Size: Adult Adult   Pulse: (!) 101 84   Weight: 94.8 kg (209 lb)    Height: 1.676 m (5' 6\")         Physical Exam  Vitals reviewed.   Constitutional:       General: She is not in acute distress.     Appearance: She is not diaphoretic.   HENT:      Head: Normocephalic and atraumatic.   Eyes:      General: No scleral icterus.     Conjunctiva/sclera: Conjunctivae normal.   Cardiovascular:      Rate and Rhythm: Normal rate and regular rhythm.      Heart sounds: Normal heart sounds. No murmur heard.  Pulmonary:      Effort: Pulmonary effort is normal. No respiratory distress.      Breath sounds: Normal breath sounds. No wheezing or rales.   Musculoskeletal:      Right lower leg: No edema.      Left lower leg: No edema.   Skin:     " "Coloration: Skin is not pale.   Neurological:      General: No focal deficit present.      Mental Status: She is alert and oriented to person, place, and time.      Cranial Nerves: No cranial nerve deficit.      Coordination: Coordination normal.      Gait: Gait is intact. Gait normal.   Psychiatric:         Mood and Affect: Mood and affect normal.         Behavior: Behavior normal.          DATA REVIEW    Lab Results   Component Value Date/Time    CHOLSTRLTOT 179 04/24/2023 08:58 AM     (H) 04/24/2023 08:58 AM    HDL 51 04/24/2023 08:58 AM    TRIGLYCERIDE 125 04/24/2023 08:58 AM       Lab Results   Component Value Date/Time    SODIUM 143 03/04/2023 02:11 PM    SODIUM 136 01/23/2016 05:05 AM    POTASSIUM 3.0 (L) 03/04/2023 02:11 PM    POTASSIUM 3.5 (L) 01/23/2016 05:05 AM    CHLORIDE 107 03/04/2023 02:11 PM    CHLORIDE 106 01/23/2016 05:05 AM    CO2 26.0 03/04/2023 02:11 PM    CO2 19 (L) 01/23/2016 05:05 AM    GLUCOSE 118 03/04/2023 02:11 PM    GLUCOSE 111 (H) 01/23/2016 05:05 AM    BUN 7.0 03/04/2023 02:11 PM    BUN 9 01/23/2016 05:05 AM    CREATININE 0.8 03/04/2023 02:11 PM    CREATININE 0.98 01/23/2016 05:05 AM    BUNCREATRAT 8.8 (L) 03/04/2023 02:11 PM     Lab Results   Component Value Date/Time    ALKPHOSPHAT 88 03/04/2023 02:11 PM    ALKPHOSPHAT 78 01/23/2016 05:05 AM    ASTSGOT 21 03/04/2023 02:11 PM    ASTSGOT 18 01/23/2016 05:05 AM    ALTSGPT 16 03/04/2023 02:11 PM    ALTSGPT 11 01/23/2016 05:05 AM    TBILIRUBIN 0.4 03/04/2023 02:11 PM    TBILIRUBIN 0.9 01/23/2016 05:05 AM       Lab Results   Component Value Date/Time    HBA1C 5.4 04/24/2023 08:58 AM       No results found for: \"MICROALBCALC\", \"MALBCRT\", \"MALBEXCR\", \"AQSWZF28\", \"MICROALBUR\", \"MICRALB\", \"UMICROALBUM\", \"MICROALBTIM\"      CTA March 2023  Aortic arch vessels demonstrate origin of the left common carotid artery from the right artery consistent with a bovine type arch.  Ascending aorta is aneurysmal and measures 46 mm by 47 mm AP.  At the " level of aortic valve the diameter is 38 mm.  Aortic arch is 28 mm in diameter.  Ascending thoracic aorta is normal in 29 mm    Echocardiogram April 2023  No prior study is available for comparison.   The left ventricular ejection fraction is visually estimated to be 60%.  Normal regional wall motion.  The ascending aorta is borderline dilated with a diameter of 4 cm.  Structurally normal aortic valve without significant stenosis or   regurgitation.      Medical Decision Making:  Today's Assessment / Status / Plan:     1. Aneurysm of ascending aorta without rupture (HCC)  Basic Metabolic Panel    CT-CTA CHEST WITH & W/O-POST PROCESS      2. Primary hypertension  amLODIPine (NORVASC) 5 MG Tab      3. Tobacco use  nicotine (NICODERM) 7 MG/24HR PATCH 24 HR      4. Smokes with greater than 40 pack year history        5. Dyslipidemia             Patient Type: Secondary Prevention    Etiology of Established CVD if Present:     1) Ascending TAA -does not appear to be associated with BAV.  Does not appear to be familial syndrome.  Likely multifactorial.  Discussed the need for lifelong imaging and risk factor modification  -Repeat CTA March 2024 and plan further surveillance based upon results - ordered today  -Avoid all stimulants-  -Lifestyle modification and medical management as per below    2.  CAD with reported history of MI x3 but no known previous intervention -currently asymptomatic.  Continue lifestyle modification and medical management as per below    Lipid Management: Qualifies for Statin Therapy Based on 2018 ACC/AHA Guidelines: yes  Calculated 10-Year Risk of ASCVD: N/A  Currently on Statin: Yes  Goal LDL less than 70 non-HDL less than 100  Unclear level of control since starting atorvastatin  No current labs  Plan:  -Continue atorvastatin 40 mg a day for now  -Repeat fasting lipid panel and lipoprotein a- reprinted  -consider intensification of therapy depending upon results    Blood Pressure  Management:  [facility] (2017) Blood Pressure Goal <130/80  Suboptimally controlled both at home and in the office  Recently started back on her medications  She does have considerable COPD and since we are not sure if there is a reversible component we may want to avoid beta blockade unless we know her aneurysm is increasing in size  Pt with increased home stressors.  Unclear what home reading are as bp long not available, but significantly elevated per pt report, .  No current labs, hesitant to adjust medications without current labs.  Reviewed importance of stress management, encouraged deep breathing and mindfulness.    Plan:  -Continue losartan 100 mg daily which may also have benefits over and above blood pressure control and reducing aneurysm expansion  - INCREASE amlodipine to 10 mg a day, monitor for leg swelling  -Careful home blood pressure log.  Reviewed proper technique, PT TO BRING IN BP LOG TO NEXT APPT  -recheck bp after 5 minutes (sitting in same spot) if bp >150/80  -Check GFR, electrolytes, urine for albumin - reprinted  -Consider additional agent if remains above goal, likely thiazide type diuretic depending upon electrolytes and renal function    Glycemic Status: Normal  Continue lifestyle modification    Anti-Platelet/Anti-Coagulant Tx: yes  Continue aspirin 81 mg a day    Smoking: Current everyday smoker  She has cut down from 2 packs to half a pack a day  Reviewed she need to  pick a quit date on the day she gets her patches from the pharmacy  Instructed to put a patch on every day in the morning and take it off at night.  Do not smoke and use patches at the same time  Can use nicotine lozenge as needed  Continue to address at every visit  Has not followed up with pharmacotherapy smoking cessation clinic - pt not ready to return at this time.    3 minutes of todays visit was spent discussing smoking cessation.      Physical Activity: Continue much improved exercise routine    Weight Management  and Nutrition: She is lost 10 pounds.  She should continue with slow steady weight loss    Other:     -COPD -defer additional work-up and management to PCP and/or pulmonary    -Lupus -unclear how diagnosis was made.  Await urine for protein and albumin.  Otherwise defer further work-up and management to PCP    Instructed to follow-up with PCP for remainder of adult medical needs: yes  We will partner with other providers in the management of established vascular disease and cardiometabolic risk factors.    Studies to Be Obtained: CTA chest March 2024 - ordered today  Labs to Be Obtained: As above prior to next visit - reprinted     Follow up in: 4 weeks to review BPs and labs    Roxi SANCHEZ  [facility]      Cc:   PRABHAKAR Loo

## 2023-12-05 ENCOUNTER — HOSPITAL ENCOUNTER (OUTPATIENT)
Dept: LAB | Facility: MEDICAL CENTER | Age: 53
End: 2023-12-05
Attending: INTERNAL MEDICINE
Payer: MEDICAID

## 2023-12-05 ENCOUNTER — HOSPITAL ENCOUNTER (OUTPATIENT)
Dept: LAB | Facility: MEDICAL CENTER | Age: 53
End: 2023-12-05
Payer: MEDICAID

## 2023-12-05 DIAGNOSIS — E78.5 DYSLIPIDEMIA: ICD-10-CM

## 2023-12-05 DIAGNOSIS — I10 PRIMARY HYPERTENSION: ICD-10-CM

## 2023-12-05 DIAGNOSIS — I71.21 ANEURYSM OF ASCENDING AORTA WITHOUT RUPTURE (HCC): ICD-10-CM

## 2023-12-05 LAB
ALBUMIN SERPL BCP-MCNC: 4 G/DL (ref 3.2–4.9)
ALBUMIN/GLOB SERPL: 1.2 G/DL
ALP SERPL-CCNC: 105 U/L (ref 30–99)
ALT SERPL-CCNC: 9 U/L (ref 2–50)
ANION GAP SERPL CALC-SCNC: 10 MMOL/L (ref 7–16)
ANION GAP SERPL CALC-SCNC: 9 MMOL/L (ref 7–16)
AST SERPL-CCNC: 10 U/L (ref 12–45)
BILIRUB SERPL-MCNC: 0.2 MG/DL (ref 0.1–1.5)
BUN SERPL-MCNC: 9 MG/DL (ref 8–22)
BUN SERPL-MCNC: 9 MG/DL (ref 8–22)
CALCIUM ALBUM COR SERPL-MCNC: 9.4 MG/DL (ref 8.5–10.5)
CALCIUM SERPL-MCNC: 8.9 MG/DL (ref 8.5–10.5)
CALCIUM SERPL-MCNC: 9.4 MG/DL (ref 8.5–10.5)
CHLORIDE SERPL-SCNC: 102 MMOL/L (ref 96–112)
CHLORIDE SERPL-SCNC: 104 MMOL/L (ref 96–112)
CHOLEST SERPL-MCNC: 170 MG/DL (ref 100–199)
CO2 SERPL-SCNC: 27 MMOL/L (ref 20–33)
CO2 SERPL-SCNC: 27 MMOL/L (ref 20–33)
CREAT SERPL-MCNC: 0.75 MG/DL (ref 0.5–1.4)
CREAT SERPL-MCNC: 0.82 MG/DL (ref 0.5–1.4)
CREAT UR-MCNC: 28.71 MG/DL
ERYTHROCYTE [DISTWIDTH] IN BLOOD BY AUTOMATED COUNT: 43.5 FL (ref 35.9–50)
FASTING STATUS PATIENT QL REPORTED: NORMAL
GFR SERPLBLD CREATININE-BSD FMLA CKD-EPI: 86 ML/MIN/1.73 M 2
GFR SERPLBLD CREATININE-BSD FMLA CKD-EPI: 95 ML/MIN/1.73 M 2
GLOBULIN SER CALC-MCNC: 3.3 G/DL (ref 1.9–3.5)
GLUCOSE SERPL-MCNC: 77 MG/DL (ref 65–99)
GLUCOSE SERPL-MCNC: 80 MG/DL (ref 65–99)
HCT VFR BLD AUTO: 46.1 % (ref 37–47)
HDLC SERPL-MCNC: 52 MG/DL
HGB BLD-MCNC: 15 G/DL (ref 12–16)
LDLC SERPL CALC-MCNC: 96 MG/DL
MCH RBC QN AUTO: 28.7 PG (ref 27–33)
MCHC RBC AUTO-ENTMCNC: 32.5 G/DL (ref 32.2–35.5)
MCV RBC AUTO: 88.3 FL (ref 81.4–97.8)
MICROALBUMIN UR-MCNC: <1.2 MG/DL
MICROALBUMIN/CREAT UR: NORMAL MG/G (ref 0–30)
PLATELET # BLD AUTO: 383 K/UL (ref 164–446)
PMV BLD AUTO: 10.5 FL (ref 9–12.9)
POTASSIUM SERPL-SCNC: 4.2 MMOL/L (ref 3.6–5.5)
POTASSIUM SERPL-SCNC: 4.4 MMOL/L (ref 3.6–5.5)
PROT SERPL-MCNC: 7.3 G/DL (ref 6–8.2)
RBC # BLD AUTO: 5.22 M/UL (ref 4.2–5.4)
SODIUM SERPL-SCNC: 139 MMOL/L (ref 135–145)
SODIUM SERPL-SCNC: 140 MMOL/L (ref 135–145)
TRIGL SERPL-MCNC: 110 MG/DL (ref 0–149)
TSH SERPL DL<=0.005 MIU/L-ACNC: 0.53 UIU/ML (ref 0.38–5.33)
WBC # BLD AUTO: 6.8 K/UL (ref 4.8–10.8)

## 2023-12-05 PROCEDURE — 85027 COMPLETE CBC AUTOMATED: CPT

## 2023-12-05 PROCEDURE — 83695 ASSAY OF LIPOPROTEIN(A): CPT

## 2023-12-05 PROCEDURE — 80048 BASIC METABOLIC PNL TOTAL CA: CPT

## 2023-12-05 PROCEDURE — 82570 ASSAY OF URINE CREATININE: CPT

## 2023-12-05 PROCEDURE — 82043 UR ALBUMIN QUANTITATIVE: CPT

## 2023-12-05 PROCEDURE — 80061 LIPID PANEL: CPT

## 2023-12-05 PROCEDURE — 80053 COMPREHEN METABOLIC PANEL: CPT

## 2023-12-05 PROCEDURE — 84443 ASSAY THYROID STIM HORMONE: CPT

## 2023-12-05 PROCEDURE — 36415 COLL VENOUS BLD VENIPUNCTURE: CPT

## 2023-12-07 LAB — LPA SERPL-MCNC: 102 MG/DL

## 2023-12-11 ENCOUNTER — APPOINTMENT (OUTPATIENT)
Dept: RADIOLOGY | Facility: MEDICAL CENTER | Age: 53
End: 2023-12-11
Attending: EMERGENCY MEDICINE
Payer: MEDICAID

## 2023-12-11 ENCOUNTER — HOSPITAL ENCOUNTER (EMERGENCY)
Facility: MEDICAL CENTER | Age: 53
End: 2023-12-11
Attending: EMERGENCY MEDICINE
Payer: MEDICAID

## 2023-12-11 VITALS
SYSTOLIC BLOOD PRESSURE: 154 MMHG | WEIGHT: 206.79 LBS | HEIGHT: 66 IN | BODY MASS INDEX: 33.23 KG/M2 | DIASTOLIC BLOOD PRESSURE: 81 MMHG | OXYGEN SATURATION: 94 % | TEMPERATURE: 97.2 F | HEART RATE: 73 BPM | RESPIRATION RATE: 18 BRPM

## 2023-12-11 DIAGNOSIS — R20.2 PARESTHESIAS: ICD-10-CM

## 2023-12-11 LAB
ABO + RH BLD: NORMAL
ABO GROUP BLD: NORMAL
ALBUMIN SERPL BCP-MCNC: 4.3 G/DL (ref 3.2–4.9)
ALBUMIN/GLOB SERPL: 1.5 G/DL
ALP SERPL-CCNC: 105 U/L (ref 30–99)
ALT SERPL-CCNC: 10 U/L (ref 2–50)
ANION GAP SERPL CALC-SCNC: 12 MMOL/L (ref 7–16)
APTT PPP: 31.2 SEC (ref 24.7–36)
AST SERPL-CCNC: 11 U/L (ref 12–45)
BASOPHILS # BLD AUTO: 0.7 % (ref 0–1.8)
BASOPHILS # BLD: 0.06 K/UL (ref 0–0.12)
BILIRUB SERPL-MCNC: 0.4 MG/DL (ref 0.1–1.5)
BLD GP AB SCN SERPL QL: NORMAL
BUN SERPL-MCNC: 16 MG/DL (ref 8–22)
CALCIUM ALBUM COR SERPL-MCNC: 9.1 MG/DL (ref 8.5–10.5)
CALCIUM SERPL-MCNC: 9.3 MG/DL (ref 8.5–10.5)
CHLORIDE SERPL-SCNC: 102 MMOL/L (ref 96–112)
CO2 SERPL-SCNC: 23 MMOL/L (ref 20–33)
CREAT SERPL-MCNC: 0.97 MG/DL (ref 0.5–1.4)
EKG IMPRESSION: NORMAL
EOSINOPHIL # BLD AUTO: 0.11 K/UL (ref 0–0.51)
EOSINOPHIL NFR BLD: 1.3 % (ref 0–6.9)
ERYTHROCYTE [DISTWIDTH] IN BLOOD BY AUTOMATED COUNT: 41.1 FL (ref 35.9–50)
GFR SERPLBLD CREATININE-BSD FMLA CKD-EPI: 70 ML/MIN/1.73 M 2
GLOBULIN SER CALC-MCNC: 2.9 G/DL (ref 1.9–3.5)
GLUCOSE BLD STRIP.AUTO-MCNC: 111 MG/DL (ref 65–99)
GLUCOSE SERPL-MCNC: 96 MG/DL (ref 65–99)
HCT VFR BLD AUTO: 43.3 % (ref 37–47)
HGB BLD-MCNC: 14.8 G/DL (ref 12–16)
IMM GRANULOCYTES # BLD AUTO: 0.03 K/UL (ref 0–0.11)
IMM GRANULOCYTES NFR BLD AUTO: 0.4 % (ref 0–0.9)
INR PPP: 1.03 (ref 0.87–1.13)
LYMPHOCYTES # BLD AUTO: 2.03 K/UL (ref 1–4.8)
LYMPHOCYTES NFR BLD: 24.1 % (ref 22–41)
MCH RBC QN AUTO: 29 PG (ref 27–33)
MCHC RBC AUTO-ENTMCNC: 34.2 G/DL (ref 32.2–35.5)
MCV RBC AUTO: 84.9 FL (ref 81.4–97.8)
MONOCYTES # BLD AUTO: 0.76 K/UL (ref 0–0.85)
MONOCYTES NFR BLD AUTO: 9 % (ref 0–13.4)
NEUTROPHILS # BLD AUTO: 5.42 K/UL (ref 1.82–7.42)
NEUTROPHILS NFR BLD: 64.5 % (ref 44–72)
NRBC # BLD AUTO: 0 K/UL
NRBC BLD-RTO: 0 /100 WBC (ref 0–0.2)
PLATELET # BLD AUTO: 388 K/UL (ref 164–446)
PMV BLD AUTO: 10.1 FL (ref 9–12.9)
POTASSIUM SERPL-SCNC: 3.8 MMOL/L (ref 3.6–5.5)
PROT SERPL-MCNC: 7.2 G/DL (ref 6–8.2)
PROTHROMBIN TIME: 13.6 SEC (ref 12–14.6)
RBC # BLD AUTO: 5.1 M/UL (ref 4.2–5.4)
RH BLD: NORMAL
SODIUM SERPL-SCNC: 137 MMOL/L (ref 135–145)
TROPONIN T SERPL-MCNC: 8 NG/L (ref 6–19)
WBC # BLD AUTO: 8.4 K/UL (ref 4.8–10.8)

## 2023-12-11 PROCEDURE — 82962 GLUCOSE BLOOD TEST: CPT

## 2023-12-11 PROCEDURE — 84484 ASSAY OF TROPONIN QUANT: CPT

## 2023-12-11 PROCEDURE — 93005 ELECTROCARDIOGRAM TRACING: CPT

## 2023-12-11 PROCEDURE — 70496 CT ANGIOGRAPHY HEAD: CPT

## 2023-12-11 PROCEDURE — 93005 ELECTROCARDIOGRAM TRACING: CPT | Performed by: EMERGENCY MEDICINE

## 2023-12-11 PROCEDURE — 700117 HCHG RX CONTRAST REV CODE 255: Mod: UD | Performed by: EMERGENCY MEDICINE

## 2023-12-11 PROCEDURE — 70498 CT ANGIOGRAPHY NECK: CPT

## 2023-12-11 PROCEDURE — 71045 X-RAY EXAM CHEST 1 VIEW: CPT

## 2023-12-11 PROCEDURE — 85730 THROMBOPLASTIN TIME PARTIAL: CPT

## 2023-12-11 PROCEDURE — 0042T CT-CEREBRAL PERFUSION ANALYSIS: CPT

## 2023-12-11 PROCEDURE — 86850 RBC ANTIBODY SCREEN: CPT

## 2023-12-11 PROCEDURE — 70450 CT HEAD/BRAIN W/O DYE: CPT | Mod: XU

## 2023-12-11 PROCEDURE — 99284 EMERGENCY DEPT VISIT MOD MDM: CPT

## 2023-12-11 PROCEDURE — 80053 COMPREHEN METABOLIC PANEL: CPT

## 2023-12-11 PROCEDURE — 86900 BLOOD TYPING SEROLOGIC ABO: CPT

## 2023-12-11 PROCEDURE — 36415 COLL VENOUS BLD VENIPUNCTURE: CPT

## 2023-12-11 PROCEDURE — 86901 BLOOD TYPING SEROLOGIC RH(D): CPT

## 2023-12-11 PROCEDURE — 85610 PROTHROMBIN TIME: CPT

## 2023-12-11 PROCEDURE — 85025 COMPLETE CBC W/AUTO DIFF WBC: CPT

## 2023-12-11 RX ADMIN — IOHEXOL 40 ML: 350 INJECTION, SOLUTION INTRAVENOUS at 11:00

## 2023-12-11 RX ADMIN — IOHEXOL 80 ML: 350 INJECTION, SOLUTION INTRAVENOUS at 10:38

## 2023-12-11 ASSESSMENT — FIBROSIS 4 INDEX: FIB4 SCORE: 0.45

## 2023-12-11 NOTE — ED PROVIDER NOTES
"ED Provider Note    CHIEF COMPLAINT  Chief Complaint   Patient presents with    Numbness     Reports left arm feeling \"heavy\". States woke up this morning with sx. Went to bed around 2230 last night. States \"it feels like my arm is pulling up and my face is pulling down\". Reports tingling to left leg and mild numbness in left arm.       EXTERNAL RECORDS REVIEWED  Reviewed outpatient imaging studies regarding known aortic aneurysm    HPI/ROS  LIMITATION TO HISTORY   None  OUTSIDE HISTORIAN(S):  Family provided additional history    Thelma Segovia is a 53 y.o. female who presents for evaluation of a constellation of symptoms including subjective heaviness in the left arm and leg possibly some transient paresthesias to the left arm and leg without any weakness.  The patient denies headache or chest pain.  No preceding symptoms such as night sweats weight loss fevers chills.  She reportedly went to sleep normal last night and woke up with this sensation.  She is on a full-strength aspirin but no oral anticoagulants.  No report of any abdominal pain.  No recent trauma or fall.  No difficulty finding words or confusion no reported ataxia.    PAST MEDICAL HISTORY   has a past medical history of COPD (chronic obstructive pulmonary disease) ([facility]), Hypertension, Lupus ([facility]), and MI (myocardial infarction) ([facility]) (2007).    SURGICAL HISTORY   has a past surgical history that includes hysterectomy, total abdominal; c-sec only,prev c-sec; and hysterectomy laparoscopy.    FAMILY HISTORY  No family history on file.    SOCIAL HISTORY  Social History     Tobacco Use    Smoking status: Every Day     Current packs/day: 1.50     Average packs/day: 1.5 packs/day for 40.0 years (60.0 ttl pk-yrs)     Types: Cigarettes    Smokeless tobacco: Never   Substance and Sexual Activity    Alcohol use: No    Drug use: No    Sexual activity: Not on file       CURRENT MEDICATIONS  Home Medications       Reviewed by Anita Holder R.N. (Registered " "Nurse) on 12/11/23 at 0913  Med List Status: Partial     Medication Last Dose Status   albuterol 108 (90 Base) MCG/ACT Aero Soln inhalation aerosol  Active   amLODIPine (NORVASC) 5 MG Tab  Active   aspirin 81 MG EC tablet  Active   atorvastatin (LIPITOR) 40 MG Tab  Active   ATROVENT HFA 17 MCG/ACT Aero Soln  Active   Blood Pressure Monitoring (COMFORT TOUCH BP CUFF/MEDIUM) Misc  Active   losartan (COZAAR) 100 MG Tab  Active   nicotine (NICODERM) 7 MG/24HR PATCH 24 HR  Active                    ALLERGIES  Allergies   Allergen Reactions    Pcn [Penicillins] Anaphylaxis, Shortness of Breath and Swelling       PHYSICAL EXAM  VITAL SIGNS: BP (!) 150/78   Pulse 81   Temp 36.1 °C (97 °F) (Temporal)   Resp 19   Ht 1.676 m (5' 6\")   Wt 93.8 kg (206 lb 12.7 oz)   SpO2 94%   BMI 33.38 kg/m²    Pulse ox interpretation: I interpret this pulse ox as normal.  Constitutional: Alert and oriented x 3, no acute distress  HEENT: Atraumatic normocephalic, pupils are equal round reactive to light extraocular movements are intact. The nares is clear, external ears are normal, mouth shows moist mucous membranes normal dentition for age  Neck: Supple, no JVD no tracheal deviation  Cardiovascular: Regular rate and rhythm no murmur rub or gallop 2+ pulses peripherally x4  Thorax & Lungs: No respiratory distress, no wheezes rales or rhonchi, No chest tenderness.   GI: Soft nontender nondistended positive bowel sounds, no peritoneal signs no rebound guarding or rigidity  Skin: Warm dry no acute rash or lesion  Musculoskeletal: Moving all extremities with full range and 5 of 5 strength no acute  deformity no pedal edema  Neurologic: GCS 15 alert and oriented x 4.  No facial droop.  No pronator drift of the arms or legs.  Finger-nose testing is normal bilaterally.  Visual fields grossly normal.  Initially patient had subjective paresthesia to the left forearm hand deltoid thigh and foot but when I reexamined her she said she had no " subjective deficit.  NIH stroke scale score 0 cranial nerves III through XII are grossly intact no sensory deficit no cerebellar dysfunction   Psychiatric: Appropriate affect for situation at this time          DIAGNOSTIC STUDIES / PROCEDURES      LABS  Results for orders placed or performed during the hospital encounter of 12/11/23   CBC WITH DIFFERENTIAL   Result Value Ref Range    WBC 8.4 4.8 - 10.8 K/uL    RBC 5.10 4.20 - 5.40 M/uL    Hemoglobin 14.8 12.0 - 16.0 g/dL    Hematocrit 43.3 37.0 - 47.0 %    MCV 84.9 81.4 - 97.8 fL    MCH 29.0 27.0 - 33.0 pg    MCHC 34.2 32.2 - 35.5 g/dL    RDW 41.1 35.9 - 50.0 fL    Platelet Count 388 164 - 446 K/uL    MPV 10.1 9.0 - 12.9 fL    Neutrophils-Polys 64.50 44.00 - 72.00 %    Lymphocytes 24.10 22.00 - 41.00 %    Monocytes 9.00 0.00 - 13.40 %    Eosinophils 1.30 0.00 - 6.90 %    Basophils 0.70 0.00 - 1.80 %    Immature Granulocytes 0.40 0.00 - 0.90 %    Nucleated RBC 0.00 0.00 - 0.20 /100 WBC    Neutrophils (Absolute) 5.42 1.82 - 7.42 K/uL    Lymphs (Absolute) 2.03 1.00 - 4.80 K/uL    Monos (Absolute) 0.76 0.00 - 0.85 K/uL    Eos (Absolute) 0.11 0.00 - 0.51 K/uL    Baso (Absolute) 0.06 0.00 - 0.12 K/uL    Immature Granulocytes (abs) 0.03 0.00 - 0.11 K/uL    NRBC (Absolute) 0.00 K/uL   COMP METABOLIC PANEL   Result Value Ref Range    Sodium 137 135 - 145 mmol/L    Potassium 3.8 3.6 - 5.5 mmol/L    Chloride 102 96 - 112 mmol/L    Co2 23 20 - 33 mmol/L    Anion Gap 12.0 7.0 - 16.0    Glucose 96 65 - 99 mg/dL    Bun 16 8 - 22 mg/dL    Creatinine 0.97 0.50 - 1.40 mg/dL    Calcium 9.3 8.5 - 10.5 mg/dL    Correct Calcium 9.1 8.5 - 10.5 mg/dL    AST(SGOT) 11 (L) 12 - 45 U/L    ALT(SGPT) 10 2 - 50 U/L    Alkaline Phosphatase 105 (H) 30 - 99 U/L    Total Bilirubin 0.4 0.1 - 1.5 mg/dL    Albumin 4.3 3.2 - 4.9 g/dL    Total Protein 7.2 6.0 - 8.2 g/dL    Globulin 2.9 1.9 - 3.5 g/dL    A-G Ratio 1.5 g/dL   PROTHROMBIN TIME   Result Value Ref Range    PT 13.6 12.0 - 14.6 sec    INR 1.03  0.87 - 1.13   APTT   Result Value Ref Range    APTT 31.2 24.7 - 36.0 sec   COD (ADULT)   Result Value Ref Range    ABO Grouping Only O     Rh Grouping Only POS     Antibody Screen-Cod NEG    TROPONIN   Result Value Ref Range    Troponin T 8 6 - 19 ng/L   ABO Rh Confirm   Result Value Ref Range    ABO Rh Confirm O POS    ESTIMATED GFR   Result Value Ref Range    GFR (CKD-EPI) 70 >60 mL/min/1.73 m 2   EKG   Result Value Ref Range    Report       [facility] Emergency Dept.    Test Date:  2023  Pt Name:    KARLY RICHMOND              Department: ER  MRN:        [id number]                      Room:  Gender:     Female                       Technician: [id number]  :        1970                   Requested By:ER TRIAGE PROTOCOL  Order #:    [id number]                    Reading MD:    Measurements  Intervals                                Axis  Rate:       76                           P:          65  NE:         165                          QRS:        13  QRSD:       101                          T:          42  QT:         426  QTc:        480    Interpretive Statements  Sinus rhythm  Probable left atrial enlargement  Anterior infarct, old  Compared to ECG 2012 15:24:05  No significant changes     POCT glucose device results   Result Value Ref Range    POC Glucose, Blood 111 (H) 65 - 99 mg/dL      EKG 12 interpretation by me, rate 76 sinus rhythm.  Left atrial enlargement.No evidence of new or acute ST segment elevation or depression no pathological T wave inversions no evidence of arrhythmia heart block or ischemia  RADIOLOGY  I have independently interpreted the diagnostic imaging associated with this visit and am waiting the final reading from the radiologist.   My preliminary interpretation is as follows:  no evidence of intracranial hemorrhage large vessel occlusion or obvious stroke  Radiologist interpretation:  CT-CTA NECK WITH & W/O-POST PROCESSING   Final Result      CT angiogram  of the neck within normal limits.      CT-CTA HEAD WITH & W/O-POST PROCESS   Final Result      CT angiogram of the [place] within normal limits.      CT-CEREBRAL PERFUSION ANALYSIS   Final Result      Cerebral Perfusion study with no evidence of acute cerebral infarction or ischemia with attention to the MCA territories.      CT-HEAD W/O   Final Result      Head CT without contrast within normal limits. No evidence of acute cerebral infarction, hemorrhage or mass lesion.         DX-CHEST-PORTABLE (1 VIEW)   Final Result      No acute cardiac or pulmonary abnormalities are identified.          COURSE & MEDICAL DECISION MAKING    ED Observation Status? Yes; I am placing the patient in to an observation status due to a diagnostic uncertainty as well as therapeutic intensity. Patient placed in observation status at  920 AM, 12/11/2023.     Observation plan is as follows:  establish an IV, perform serial neurological exams perform extensive blood testing and imaging studies    Upon Reevaluation, the patient's condition has: Improved; and will be discharged.    Patient discharged from ED Observation status at  1125 (Time) 12/11 (Date).     INITIAL ASSESSMENT, COURSE AND PLAN  Care Narrative:     This is a very pleasant 53-year-old female presents here with transient paresthesias primarily to the left arm.  On arrival here she had no ongoing symptoms and her NIH stroke scale score 0.  We did proceed with stroke workup and observation.  Patient was observed for several hours and her transient subjective paresthesias never recurred.  With stroke workup did not demonstrate any large vessel occlusion, mass effect hemorrhage and there is no perfusion deficit.  Further history taking reveals that the patient admits that she has been under a lot of stress lately and she thinks this was all related to panic.  I considered but did not feel that admission for further restratification for possible TIA is indicated.  I  counseled her to return as needed for new or worsening symptoms      ADDITIONAL PROBLEM LIST    DISPOSITION AND DISCUSSIONS  I have discussed management of the patient with the following physicians and SACHIN's: None    Discussion of management with other QHP or appropriate source(s): None    Escalation of care considered, and ultimately not performed:acute inpatient care management, however at this time, the patient is most appropriate for outpatient management    Barriers to care at this time, including but not limited to: None.     Decision tools and prescription drugs considered including, but not limited to: None.    FINAL DIAGNOSIS  Paresthesia to the left arm  Anxiety       Electronically signed by: Francisco J Sanchez M.D., 12/11/2023 9:24 AM

## 2023-12-11 NOTE — ED TRIAGE NOTES
"Chief Complaint   Patient presents with    Numbness     Reports left arm feeling \"heavy\". States woke up this morning with sx. Went to bed around 2230 last night. States \"it feels like my arm is pulling up and my face is pulling down\". Reports tingling to left leg and mild numbness in left arm.     Pt ambulates to triage for above. Hx ascending aortic aneurysm, HTN & MI. Takes baby ASA daily.    EKG completed.  in triage. Charge RN notified, pt activated as stroke assessment.   "

## 2023-12-11 NOTE — ED NOTES
Pt provided discharge instructions. Pt verbalized understanding of all instructions. IV removed, cathlon intact, site without s/s of infection. Pt ambulatory to lobby w/ steady gait.

## 2024-03-01 ENCOUNTER — HOSPITAL ENCOUNTER (OUTPATIENT)
Dept: RADIOLOGY | Facility: MEDICAL CENTER | Age: 54
End: 2024-03-01
Payer: MEDICAID

## 2024-03-01 DIAGNOSIS — I71.21 ANEURYSM OF ASCENDING AORTA WITHOUT RUPTURE (HCC): ICD-10-CM

## 2024-03-01 PROCEDURE — 700117 HCHG RX CONTRAST REV CODE 255: Mod: UD

## 2024-03-01 PROCEDURE — 71275 CT ANGIOGRAPHY CHEST: CPT

## 2024-03-01 RX ADMIN — IOHEXOL 100 ML: 350 INJECTION, SOLUTION INTRAVENOUS at 11:38

## 2024-03-02 ENCOUNTER — DOCUMENTATION (OUTPATIENT)
Dept: VASCULAR LAB | Facility: MEDICAL CENTER | Age: 54
End: 2024-03-02
Payer: MEDICAID

## 2024-03-02 NOTE — PROGRESS NOTES
CTA with perhaps modest grown in ascnending TAA - but still overall small.   Repeat echo in one year - march 2025    Michael Bloch, MD  Vascular Care

## 2024-03-02 NOTE — Clinical Note
BC - kym CTA as done. Echo in one year Ko - let patient know that aneurysm is maybe a bit bigger than previous, but still small. We will keep an eye on it. Overdue for f/u. Pls schedule with donnyn

## 2024-03-07 ENCOUNTER — DOCUMENTATION (OUTPATIENT)
Dept: VASCULAR LAB | Facility: MEDICAL CENTER | Age: 54
End: 2024-03-07
Payer: MEDICAID

## 2024-03-08 NOTE — PROGRESS NOTES
Left message for patient to call back to discuss below message from Dr. Bloch. Details not left on vm.       Michael J Bloch, M.D.  Alley Vargas, RCHE.; Alyce Ford, [facility] Ass't  BC - kym CTA as done. Echo in one year  Ko - let patient know that aneurysm is maybe a bit bigger than previous, but still small. We will keep an eye on it. Overdue for f/u. Pls schedule with leoncio Ford, Medical Assistant   [facility] Medicine   Ph: [phone]  Fx: [phone]

## 2024-03-11 ENCOUNTER — DOCUMENTATION (OUTPATIENT)
Dept: VASCULAR LAB | Facility: MEDICAL CENTER | Age: 54
End: 2024-03-11
Payer: MEDICAID

## 2024-03-11 NOTE — PROGRESS NOTES
Left message for patient to call back and schedule LOT with Tabitha LAZAR. Patient to schedule appt for early to mid June.       Alyce Ford, Medical Assistant   [facility] Vascular Medicine   Ph: [phone]  Fx: [phone]

## 2024-03-11 NOTE — PROGRESS NOTES
Called patient to relay [facility] message from Dr. Bloch:    Michael J Bloch, M.D.  Alley Vargas, DILIP.; Alyce Ford, [facility] Ass't  BC - kym CTA as done. Echo in one year  Ko - let patient know that aneurysm is maybe a bit bigger than previous, but still small. We will keep an eye on it. Overdue for f/u. Pls schedule with apn    Patients phone number not working at this time. Will try again at a later date.       Alyce Ford, Medical Assistant   [facility]   Ph: [phone]  Fx: [phone]

## 2024-03-18 ENCOUNTER — OFFICE VISIT (OUTPATIENT)
Dept: MEDICAL GROUP | Facility: CLINIC | Age: 54
End: 2024-03-18
Payer: MEDICAID

## 2024-03-18 ENCOUNTER — DOCUMENTATION (OUTPATIENT)
Dept: VASCULAR LAB | Facility: MEDICAL CENTER | Age: 54
End: 2024-03-18

## 2024-03-18 ENCOUNTER — PATIENT MESSAGE (OUTPATIENT)
Dept: MEDICAL GROUP | Facility: CLINIC | Age: 54
End: 2024-03-18

## 2024-03-18 ENCOUNTER — RESEARCH ENCOUNTER (OUTPATIENT)
Dept: MEDICAL GROUP | Facility: CLINIC | Age: 54
End: 2024-03-18
Payer: MEDICAID

## 2024-03-18 VITALS
TEMPERATURE: 98.7 F | HEIGHT: 66 IN | BODY MASS INDEX: 33.91 KG/M2 | WEIGHT: 211 LBS | OXYGEN SATURATION: 94 % | SYSTOLIC BLOOD PRESSURE: 122 MMHG | DIASTOLIC BLOOD PRESSURE: 84 MMHG | HEART RATE: 75 BPM

## 2024-03-18 DIAGNOSIS — Z00.6 RESEARCH STUDY PATIENT: ICD-10-CM

## 2024-03-18 DIAGNOSIS — I20.0 UNSTABLE ANGINA (HCC): ICD-10-CM

## 2024-03-18 DIAGNOSIS — Z72.0 TOBACCO USE: ICD-10-CM

## 2024-03-18 DIAGNOSIS — Z23 NEED FOR VACCINATION: ICD-10-CM

## 2024-03-18 DIAGNOSIS — I25.110 CORONARY ARTERY DISEASE INVOLVING NATIVE HEART WITH UNSTABLE ANGINA PECTORIS, UNSPECIFIED VESSEL OR LESION TYPE (HCC): ICD-10-CM

## 2024-03-18 DIAGNOSIS — Z13.79 GENETIC SCREENING: ICD-10-CM

## 2024-03-18 DIAGNOSIS — F41.9 ANXIETY: ICD-10-CM

## 2024-03-18 DIAGNOSIS — I25.2 PERSONAL HISTORY OF MI (MYOCARDIAL INFARCTION): ICD-10-CM

## 2024-03-18 DIAGNOSIS — R06.02 SHORTNESS OF BREATH: ICD-10-CM

## 2024-03-18 PROCEDURE — 3079F DIAST BP 80-89 MM HG: CPT

## 2024-03-18 PROCEDURE — 99213 OFFICE O/P EST LOW 20 MIN: CPT | Mod: 25,GE

## 2024-03-18 PROCEDURE — 90686 IIV4 VACC NO PRSV 0.5 ML IM: CPT

## 2024-03-18 PROCEDURE — 90471 IMMUNIZATION ADMIN: CPT | Mod: GE

## 2024-03-18 PROCEDURE — 3074F SYST BP LT 130 MM HG: CPT

## 2024-03-18 PROCEDURE — 93000 ELECTROCARDIOGRAM COMPLETE: CPT | Mod: GE

## 2024-03-18 RX ORDER — NITROGLYCERIN 0.3 MG/1
0.3 TABLET SUBLINGUAL PRN
Qty: 100 TABLET | Refills: 0 | Status: SHIPPED | OUTPATIENT
Start: 2024-03-18

## 2024-03-18 RX ORDER — LOSARTAN POTASSIUM 50 MG/1
100 TABLET ORAL DAILY
COMMUNITY

## 2024-03-18 RX ORDER — METOPROLOL SUCCINATE 25 MG/1
25 TABLET, EXTENDED RELEASE ORAL DAILY
Qty: 30 TABLET | Refills: 0 | Status: SHIPPED | OUTPATIENT
Start: 2024-03-18

## 2024-03-18 ASSESSMENT — PATIENT HEALTH QUESTIONNAIRE - PHQ9
5. POOR APPETITE OR OVEREATING: 1 - SEVERAL DAYS
CLINICAL INTERPRETATION OF PHQ2 SCORE: 1

## 2024-03-18 ASSESSMENT — FIBROSIS 4 INDEX: FIB4 SCORE: 0.48

## 2024-03-18 NOTE — PROGRESS NOTES
"Subjective:     CC:   Chief Complaint   Patient presents with    Follow-Up    Lab Results         HPI:     Problem   Anxiety    Patient reports significantly increased anxiety since last visit.  States most anxiety is related to her 33-year-old son being released from senior care and coming home to live with her.  States he was in senior care for 8 years and was recently diagnosed with schizophrenia.  She is working with her son to get him on a stable medication regimen but is currently waking up with her house \"barricaded\" so that people cannot get in and waking up with her son underneath her bed.  She denies SI/HI     Tobacco Use    Patient reiterates she has a 40 year 1.5 pack year history. Still smoking 5-6 cigarettes a day. States this is how she shaun with stressors. Recent increase in stressors with her son moving home and working to get his schizophrenia under control. Has tried Wellbutrin and Varenicline but the former was not well-tolerated and the latter was too expensive.  Also reports she has tried patches but those 2 were not covered by her insurance.     Unstable Angina (Hcc)    Patient with worsening SOB with chest tightness.  States she has been using her albuterol about once daily if this occurs and it does not help.  This sensation occurs with exertion and is associated with profuse sweating.  She also reports she gets pain down her left arm when this occurs.  Patient reports this has been getting increasing in frequency over the past 6 months and the last time it occurred was 4 days ago.  Longest it lasted was for 30 minutes and eventually subsided with rest 4 days ago.  Patient does have a history of MI x 3 and last MI was about 1 year ago.  She has never done PFTs for her presumed lung disease.         ROS: See HPI.     Objective:     Exam:  /84 (BP Location: Right arm, Patient Position: Sitting, BP Cuff Size: Adult)   Pulse 75   Temp 37.1 °C (98.7 °F) (Temporal)   Ht 1.676 m (5' 6\")   Wt 95.7 " kg (211 lb)   SpO2 94%   BMI 34.06 kg/m²  Body mass index is 34.06 kg/m².    Physical Exam:  General: Pt resting in NAD, cooperative   Skin:  No cyanosis or jaundice   HEENT: NC/AT. EOMI. No conjunctival injection or sclera icterus.   Lungs:  CTAB, good air movement. Non-labored.   Cardiovascular:  S1/S2 RRR   Abdomen:  Abdomen is soft, non-tender, non-distended, +BS  Extremities:  No LE edema   CNS:  No gross focal neurologic deficits  Psych: Appropriate mood and affect     Labs: Reviewed    Assessment & Plan:     54 y.o. female with the following -     Problem List Items Addressed This Visit       Personal history of MI (myocardial infarction)    Relevant Orders    REFERRAL TO CARDIOLOGY    EKG - Clinic Performed (Completed)    Tobacco use     Chronic.  Improving.  Smoking cessation discussed with patient again today.  Shared decision making:  - Patient to continue to work on smoking without aid of medication given her financial restraints  - Will discuss with patient low-dose CT screening for smoking history once we have cardiac disease under control         Unstable angina (HCC)     Worsening.  Anginal symptoms occurring more frequently and lasting longer.  Discussed that this is most likely cardiac given history and risk factors.  Discussed options for treatment with patient.  EKG in clinic unchanged from prior without new dynamic ischemic changes.  In shared decision making:  - Urgent referral to cardiology and stress test ordered  - Starting patient on beta-blocker and continuing ARB, aspirin and statin  -Strict ED precautions given for chest pain that does not subside with rest/nitroglycerin  - Nitroglycerin prescribed and patient instructed on use  - Follow in 2 to 4 weeks as discussed           Relevant Medications    nitroGLYCERIN (NITROSTAT) 0.3 MG SL tablet    losartan (COZAAR) 50 MG Tab    metoprolol SR (TOPROL XL) 25 MG TABLET SR 24 HR    Anxiety     Chronic.  Worsening.  ZULEYKA-7 with highest score  possible.  No acute SI/HI.  Discussed options for treatment.  In shared decision making:  - Start Zoloft 50 mg  - Schedule with psychology Dr Vergara  - Follow in 2 to 4 weeks on mood  - Crisis and ED resources/precautions given         Relevant Medications    sertraline (ZOLOFT) 50 MG Tab     Other Visit Diagnoses       Need for vaccination        Relevant Orders    INFLUENZA VACCINE QUAD INJ (PF) (Completed)    Genetic screening        Relevant Orders    Referral to Genetic Research Studies    Coronary artery disease involving native heart with unstable angina pectoris, unspecified vessel or lesion type (HCC)        Relevant Medications    nitroGLYCERIN (NITROSTAT) 0.3 MG SL tablet    losartan (COZAAR) 50 MG Tab    metoprolol SR (TOPROL XL) 25 MG TABLET SR 24 HR            Referral for genetic research was offered. Patient accepted.    Patient to urgently see cardiology/undergo stress testing.  This was discussed with my attending and my attending discussed with on-call cardiologist.  Strict ED precautions were given and patient to follow in 2 to 4 weeks with close follow-up.

## 2024-03-18 NOTE — ASSESSMENT & PLAN NOTE
Chronic.  Improving.  Smoking cessation discussed with patient again today.  Shared decision making:  - Patient to continue to work on smoking without aid of medication given her financial restraints  - Will discuss with patient low-dose CT screening for smoking history once we have cardiac disease under control

## 2024-03-18 NOTE — PROGRESS NOTES
"Tried to call patient to relay below  message from Dr. Bloch:    Michael J Bloch, M.D.  Alley Vargas, DILIP.; Alyce Ford, [facility] Ass't  BC - kym CTA as done. Echo in one year  Ko - let patient know that aneurysm is maybe a bit bigger than previous, but still small. We will keep an eye on it. Overdue for f/u. Pls schedule with apn      Patients phone is \"unable to take calls at this time\". Tried patients mother as well, phone rings and then gives busy tone. Will try again later this week.       Alyce Ford, Medical Assistant   [facility]   Ph: [phone]  Fx: [phone]    "

## 2024-03-18 NOTE — ASSESSMENT & PLAN NOTE
Chronic.  Worsening.  ZULEYKA-7 with highest score possible.  No acute SI/HI.  Discussed options for treatment.  In shared decision making:  - Start Zoloft 50 mg  - Schedule with psychology Dr Vergara  - Follow in 2 to 4 weeks on mood  - Crisis and ED resources/precautions given

## 2024-03-18 NOTE — ASSESSMENT & PLAN NOTE
Worsening.  Anginal symptoms occurring more frequently and lasting longer.  Discussed that this is most likely cardiac given history and risk factors.  Discussed options for treatment with patient.  EKG in clinic unchanged from prior without new dynamic ischemic changes.  In shared decision making:  - Urgent referral to cardiology and stress test ordered  - Starting patient on beta-blocker and continuing ARB, aspirin and statin  -Strict ED precautions given for chest pain that does not subside with rest/nitroglycerin  - Nitroglycerin prescribed and patient instructed on use  - Follow in 2 to 4 weeks as discussed

## 2024-03-18 NOTE — PATIENT INSTRUCTIONS
Stress  -Referral to psychology   -Start Zoloft   -Follow in 2-4 weeks (in person or virtual)     Shortness of breath   -Stress test (call to schedule)  -Pulmonary function testing (call to schedule)     Go to ED with symptoms that don't go away with rest

## 2024-03-21 ENCOUNTER — DOCUMENTATION (OUTPATIENT)
Dept: VASCULAR LAB | Facility: MEDICAL CENTER | Age: 54
End: 2024-03-21
Payer: MEDICAID

## 2024-03-21 NOTE — PROGRESS NOTES
Spoke with patient to relay below message from Dr. Bloch:    Michael J Bloch, M.D.  Alley Vargas R.N.; Alyce Ford, [facility] Ass't  BC - kym CTA as done. Echo in one year  Ko - let patient know that aneurysm is maybe a bit bigger than previous, but still small. We will keep an eye on it. Overdue for f/u. Pls schedule with apn      Patient states understanding and is in agreement with plan. Patient scheduled for 4/10 with apn.     Alyce Ford, Medical Assistant   [facility]   Ph: [phone]  Fx: [phone]

## 2024-03-25 ENCOUNTER — HOSPITAL ENCOUNTER (OUTPATIENT)
Dept: LAB | Facility: MEDICAL CENTER | Age: 54
End: 2024-03-25
Payer: MEDICAID

## 2024-03-25 DIAGNOSIS — Z00.6 RESEARCH STUDY PATIENT: ICD-10-CM

## 2024-03-27 ENCOUNTER — NON-PROVIDER VISIT (OUTPATIENT)
Dept: SLEEP MEDICINE | Facility: MEDICAL CENTER | Age: 54
End: 2024-03-27
Payer: MEDICAID

## 2024-03-27 VITALS — BODY MASS INDEX: 35.16 KG/M2 | HEIGHT: 65 IN | WEIGHT: 211 LBS

## 2024-03-27 DIAGNOSIS — R06.02 SHORTNESS OF BREATH: ICD-10-CM

## 2024-03-27 PROCEDURE — 94726 PLETHYSMOGRAPHY LUNG VOLUMES: CPT | Performed by: INTERNAL MEDICINE

## 2024-03-27 PROCEDURE — 94060 EVALUATION OF WHEEZING: CPT | Performed by: INTERNAL MEDICINE

## 2024-03-27 PROCEDURE — 94729 DIFFUSING CAPACITY: CPT | Performed by: INTERNAL MEDICINE

## 2024-03-27 ASSESSMENT — PULMONARY FUNCTION TESTS
FEV1_PERCENT_PREDICTED: 100
FEV1/FVC_PERCENT_PREDICTED: 103
FVC_PERCENT_PREDICTED: 92
FVC_PREDICTED: 3.49
FEV1/FVC_PERCENT_LLN: 67
FEV1_LLN: 2.32
FEV1_PERCENT_CHANGE: 3
FEV1/FVC_PERCENT_PREDICTED: 102
FEV1/FVC_PERCENT_PREDICTED: 104
FEV1/FVC: 81
FEV1/FVC_PERCENT_CHANGE: 167
FEV1: 2.62
FVC_PERCENT_PREDICTED: 96
FEV1/FVC: 82.99
FEV1_PERCENT_CHANGE: 5
FVC_LLN: 2.91
FVC: 3.24
FVC: 3.35
FEV1/FVC_PERCENT_PREDICTED: 101
FEV1_PREDICTED: 2.77
FEV1/FVC_PERCENT_CHANGE: 2
FEV1/FVC: 81
FEV1/FVC_PREDICTED: 80
FEV1/FVC_PERCENT_PREDICTED: 79
FEV1/FVC: 83
FEV1: 2.78
FEV1_PERCENT_PREDICTED: 94

## 2024-03-27 ASSESSMENT — FIBROSIS 4 INDEX: FIB4 SCORE: 0.48

## 2024-03-27 NOTE — PROCEDURES
Tech: Avril Saucedo, RT  Good patient effort & cooperation.  Test was performed on the [employer] Body Plethysmograph- Elite DX system.  The predicted sets used for Spirometry are GLI-2012, for Lung Volumes are ITS, and for DLCO is GLI 2017.  The results of this test meet the ATS standards for acceptability and repeatability.  The DLCO was uncorrected for Hb.  A bronchodilator of Ventolin HFA 2 puffs via spacer was administered.  DLCO was performed during dilation period.    Interpretation  1.  There is no obstruction  2.  There is no bronchodilator response  3.  RV mildly elevated, of unclear significance  4.  DLCO is normal  5.  Flow volume loop appears normal    Normal PFTs    Alma York, DO   Pulmonary and Critical Care

## 2024-03-29 LAB
ELF SCORE: 9.18 - (ref 9.8–11.3)
RELATIVE RISK: NORMAL
RISK GROUP: NORMAL
RISK: 3.3 %

## 2024-04-05 ENCOUNTER — DOCUMENTATION (OUTPATIENT)
Dept: VASCULAR LAB | Facility: MEDICAL CENTER | Age: 54
End: 2024-04-05
Payer: MEDICAID

## 2024-04-05 NOTE — PROGRESS NOTES
Established patient  Chart prep for upcoming appointment with Vascular APRN    Any pending/incomplete orders from last visit? No, all orders completed.  Was patient called and reminded to complete pending orders? N/A orders complete  Were any records requested?  No  Correct appointment type (New/Established/virtual)? Yes  Referral up to date? Yes  Referral attached to appointment (renewals and New patients only)? N/A (established with up-to-date referral)      Alyce Ford, Medical Assistant   [facility]   Ph: [phone]  Fx: [phone]

## 2024-04-14 DIAGNOSIS — I25.110 CORONARY ARTERY DISEASE INVOLVING NATIVE HEART WITH UNSTABLE ANGINA PECTORIS, UNSPECIFIED VESSEL OR LESION TYPE (HCC): ICD-10-CM

## 2024-04-15 RX ORDER — METOPROLOL SUCCINATE 25 MG/1
25 TABLET, EXTENDED RELEASE ORAL DAILY
Qty: 30 TABLET | Refills: 0 | Status: SHIPPED | OUTPATIENT
Start: 2024-04-15

## 2024-04-15 NOTE — TELEPHONE ENCOUNTER
Received request via: Pharmacy    Was the patient seen in the last year in this department? Yes    Does the patient have an active prescription (recently filled or refills available) for medication(s) requested? No    Pharmacy Name: [facility]    Does the patient have [facility] Plus and need 100 day supply (blood pressure, diabetes and cholesterol meds only)? Patient does not have SCP

## 2024-04-21 DIAGNOSIS — F41.9 ANXIETY: ICD-10-CM

## 2024-04-21 DIAGNOSIS — R06.02 SHORTNESS OF BREATH: ICD-10-CM

## 2024-04-21 LAB
APOB+LDLR+PCSK9 GENE MUT ANL BLD/T: NOT DETECTED
BRCA1+BRCA2 DEL+DUP + FULL MUT ANL BLD/T: NOT DETECTED
MLH1+MSH2+MSH6+PMS2 GN DEL+DUP+FUL M: NOT DETECTED

## 2024-04-22 RX ORDER — NITROGLYCERIN 0.3 MG/1
TABLET SUBLINGUAL
Qty: 100 TABLET | Refills: 0 | Status: SHIPPED | OUTPATIENT
Start: 2024-04-22

## 2024-04-22 NOTE — TELEPHONE ENCOUNTER
Received request via: Pharmacy    Was the patient seen in the last year in this department? Yes    Does the patient have an active prescription (recently filled or refills available) for medication(s) requested? No    Pharmacy Name: [facility]    Does the patient have [facility] Plus and need 100 day supply (blood pressure, diabetes and cholesterol meds only)? Patient does not have SCP

## 2024-04-24 ENCOUNTER — TELEPHONE (OUTPATIENT)
Dept: CARDIOLOGY | Facility: MEDICAL CENTER | Age: 54
End: 2024-04-24

## 2024-08-06 ENCOUNTER — APPOINTMENT (OUTPATIENT)
Dept: MEDICAL GROUP | Facility: CLINIC | Age: 54
End: 2024-08-06
Payer: MEDICAID

## 2024-10-28 ENCOUNTER — OFFICE VISIT (OUTPATIENT)
Dept: MEDICAL GROUP | Facility: CLINIC | Age: 54
End: 2024-10-28
Payer: MEDICAID

## 2024-10-28 VITALS
BODY MASS INDEX: 34.72 KG/M2 | TEMPERATURE: 97.6 F | DIASTOLIC BLOOD PRESSURE: 80 MMHG | HEART RATE: 71 BPM | OXYGEN SATURATION: 97 % | HEIGHT: 66 IN | SYSTOLIC BLOOD PRESSURE: 142 MMHG | WEIGHT: 216 LBS

## 2024-10-28 DIAGNOSIS — I25.110 CORONARY ARTERY DISEASE INVOLVING NATIVE HEART WITH UNSTABLE ANGINA PECTORIS, UNSPECIFIED VESSEL OR LESION TYPE (HCC): ICD-10-CM

## 2024-10-28 DIAGNOSIS — Z87.891 HISTORY OF CIGARETTE SMOKING: ICD-10-CM

## 2024-10-28 DIAGNOSIS — I71.21 ANEURYSM OF ASCENDING AORTA WITHOUT RUPTURE (HCC): ICD-10-CM

## 2024-10-28 DIAGNOSIS — F41.9 ANXIETY: ICD-10-CM

## 2024-10-28 DIAGNOSIS — E66.811 CLASS 1 OBESITY DUE TO EXCESS CALORIES WITH BODY MASS INDEX (BMI) OF 34.0 TO 34.9 IN ADULT, UNSPECIFIED WHETHER SERIOUS COMORBIDITY PRESENT: ICD-10-CM

## 2024-10-28 DIAGNOSIS — Z72.0 TOBACCO USE: ICD-10-CM

## 2024-10-28 DIAGNOSIS — Z11.59 NEED FOR HEPATITIS C SCREENING TEST: ICD-10-CM

## 2024-10-28 DIAGNOSIS — Z12.11 SCREENING FOR COLORECTAL CANCER: ICD-10-CM

## 2024-10-28 DIAGNOSIS — Z11.4 SCREENING FOR HIV (HUMAN IMMUNODEFICIENCY VIRUS): ICD-10-CM

## 2024-10-28 DIAGNOSIS — F17.210 NICOTINE DEPENDENCE, CIGARETTES, UNCOMPLICATED: ICD-10-CM

## 2024-10-28 DIAGNOSIS — E66.09 CLASS 1 OBESITY DUE TO EXCESS CALORIES WITH BODY MASS INDEX (BMI) OF 34.0 TO 34.9 IN ADULT, UNSPECIFIED WHETHER SERIOUS COMORBIDITY PRESENT: ICD-10-CM

## 2024-10-28 DIAGNOSIS — Z12.12 SCREENING FOR COLORECTAL CANCER: ICD-10-CM

## 2024-10-28 DIAGNOSIS — R06.02 SHORTNESS OF BREATH: ICD-10-CM

## 2024-10-28 DIAGNOSIS — I10 PRIMARY HYPERTENSION: ICD-10-CM

## 2024-10-28 DIAGNOSIS — Z23 NEED FOR VACCINATION: ICD-10-CM

## 2024-10-28 PROBLEM — E66.9 OBESITY: Status: ACTIVE | Noted: 2024-10-28

## 2024-10-28 PROBLEM — Z76.0 MEDICATION REFILL: Status: ACTIVE | Noted: 2024-10-28

## 2024-10-28 PROCEDURE — 3079F DIAST BP 80-89 MM HG: CPT | Mod: GC

## 2024-10-28 PROCEDURE — 3077F SYST BP >= 140 MM HG: CPT | Mod: GC

## 2024-10-28 PROCEDURE — 99214 OFFICE O/P EST MOD 30 MIN: CPT | Mod: 25,GC

## 2024-10-28 PROCEDURE — 90677 PCV20 VACCINE IM: CPT | Mod: GC

## 2024-10-28 PROCEDURE — 90472 IMMUNIZATION ADMIN EACH ADD: CPT | Mod: GC

## 2024-10-28 PROCEDURE — 90715 TDAP VACCINE 7 YRS/> IM: CPT | Mod: GC

## 2024-10-28 PROCEDURE — 90471 IMMUNIZATION ADMIN: CPT | Mod: GC

## 2024-10-28 RX ORDER — ALBUTEROL SULFATE 90 UG/1
2 INHALANT RESPIRATORY (INHALATION) EVERY 4 HOURS PRN
Qty: 8.5 G | Refills: 0 | Status: SHIPPED | OUTPATIENT
Start: 2024-10-28

## 2024-10-28 RX ORDER — ATORVASTATIN CALCIUM 40 MG/1
40 TABLET, FILM COATED ORAL EVERY EVENING
Qty: 90 TABLET | Refills: 3 | Status: SHIPPED | OUTPATIENT
Start: 2024-10-28

## 2024-10-28 RX ORDER — IPRATROPIUM BROMIDE 17 UG/1
2 AEROSOL, METERED RESPIRATORY (INHALATION) 2 TIMES DAILY
Qty: 12.9 G | Refills: 0 | Status: SHIPPED | OUTPATIENT
Start: 2024-10-28

## 2024-10-28 RX ORDER — METOPROLOL SUCCINATE 25 MG/1
25 TABLET, EXTENDED RELEASE ORAL DAILY
Qty: 90 TABLET | Refills: 3 | Status: SHIPPED | OUTPATIENT
Start: 2024-10-28

## 2024-10-28 RX ORDER — ASPIRIN 81 MG/1
81 TABLET ORAL DAILY
Qty: 90 TABLET | Refills: 3 | Status: SHIPPED | OUTPATIENT
Start: 2024-10-28

## 2024-10-28 RX ORDER — AMLODIPINE BESYLATE 5 MG/1
10 TABLET ORAL DAILY
Qty: 180 TABLET | Refills: 3 | Status: SHIPPED | OUTPATIENT
Start: 2024-10-28

## 2024-10-28 RX ORDER — LOSARTAN POTASSIUM 50 MG/1
100 TABLET ORAL DAILY
Qty: 180 TABLET | Refills: 3 | Status: SHIPPED | OUTPATIENT
Start: 2024-10-28

## 2024-10-28 ASSESSMENT — FIBROSIS 4 INDEX: FIB4 SCORE: 0.48

## 2024-11-05 ENCOUNTER — TELEPHONE (OUTPATIENT)
Dept: HEALTH INFORMATION MANAGEMENT | Facility: OTHER | Age: 54
End: 2024-11-05

## 2024-11-07 ENCOUNTER — HOSPITAL ENCOUNTER (OUTPATIENT)
Dept: LAB | Facility: MEDICAL CENTER | Age: 54
End: 2024-11-07
Payer: MEDICAID

## 2024-11-07 DIAGNOSIS — E66.811 CLASS 1 OBESITY DUE TO EXCESS CALORIES WITH BODY MASS INDEX (BMI) OF 34.0 TO 34.9 IN ADULT, UNSPECIFIED WHETHER SERIOUS COMORBIDITY PRESENT: ICD-10-CM

## 2024-11-07 DIAGNOSIS — Z11.59 NEED FOR HEPATITIS C SCREENING TEST: ICD-10-CM

## 2024-11-07 DIAGNOSIS — E66.09 CLASS 1 OBESITY DUE TO EXCESS CALORIES WITH BODY MASS INDEX (BMI) OF 34.0 TO 34.9 IN ADULT, UNSPECIFIED WHETHER SERIOUS COMORBIDITY PRESENT: ICD-10-CM

## 2024-11-07 DIAGNOSIS — Z11.4 SCREENING FOR HIV (HUMAN IMMUNODEFICIENCY VIRUS): ICD-10-CM

## 2024-11-07 LAB
ALBUMIN SERPL BCP-MCNC: 4.2 G/DL (ref 3.2–4.9)
ALBUMIN/GLOB SERPL: 1.4 G/DL
ALP SERPL-CCNC: 115 U/L (ref 30–99)
ALT SERPL-CCNC: 16 U/L (ref 2–50)
ANION GAP SERPL CALC-SCNC: 13 MMOL/L (ref 7–16)
AST SERPL-CCNC: 18 U/L (ref 12–45)
BASOPHILS # BLD AUTO: 0.8 % (ref 0–1.8)
BASOPHILS # BLD: 0.07 K/UL (ref 0–0.12)
BILIRUB SERPL-MCNC: 0.3 MG/DL (ref 0.1–1.5)
BUN SERPL-MCNC: 11 MG/DL (ref 8–22)
CALCIUM ALBUM COR SERPL-MCNC: 9.5 MG/DL (ref 8.5–10.5)
CALCIUM SERPL-MCNC: 9.7 MG/DL (ref 8.5–10.5)
CHLORIDE SERPL-SCNC: 102 MMOL/L (ref 96–112)
CHOLEST SERPL-MCNC: 217 MG/DL (ref 100–199)
CO2 SERPL-SCNC: 24 MMOL/L (ref 20–33)
CREAT SERPL-MCNC: 0.8 MG/DL (ref 0.5–1.4)
EOSINOPHIL # BLD AUTO: 0.21 K/UL (ref 0–0.51)
EOSINOPHIL NFR BLD: 2.4 % (ref 0–6.9)
ERYTHROCYTE [DISTWIDTH] IN BLOOD BY AUTOMATED COUNT: 42.8 FL (ref 35.9–50)
EST. AVERAGE GLUCOSE BLD GHB EST-MCNC: 111 MG/DL
GFR SERPLBLD CREATININE-BSD FMLA CKD-EPI: 88 ML/MIN/1.73 M 2
GLOBULIN SER CALC-MCNC: 3 G/DL (ref 1.9–3.5)
GLUCOSE SERPL-MCNC: 83 MG/DL (ref 65–99)
HBA1C MFR BLD: 5.5 % (ref 4–5.6)
HCT VFR BLD AUTO: 46.4 % (ref 37–47)
HCV AB SER QL: NORMAL
HDLC SERPL-MCNC: 61 MG/DL
HGB BLD-MCNC: 15.1 G/DL (ref 12–16)
HIV 1+2 AB+HIV1 P24 AG SERPL QL IA: NORMAL
IMM GRANULOCYTES # BLD AUTO: 0.04 K/UL (ref 0–0.11)
IMM GRANULOCYTES NFR BLD AUTO: 0.5 % (ref 0–0.9)
LDLC SERPL CALC-MCNC: 121 MG/DL
LYMPHOCYTES # BLD AUTO: 2.4 K/UL (ref 1–4.8)
LYMPHOCYTES NFR BLD: 27.1 % (ref 22–41)
MCH RBC QN AUTO: 29.7 PG (ref 27–33)
MCHC RBC AUTO-ENTMCNC: 32.5 G/DL (ref 32.2–35.5)
MCV RBC AUTO: 91.3 FL (ref 81.4–97.8)
MONOCYTES # BLD AUTO: 0.68 K/UL (ref 0–0.85)
MONOCYTES NFR BLD AUTO: 7.7 % (ref 0–13.4)
NEUTROPHILS # BLD AUTO: 5.47 K/UL (ref 1.82–7.42)
NEUTROPHILS NFR BLD: 61.5 % (ref 44–72)
NRBC # BLD AUTO: 0 K/UL
NRBC BLD-RTO: 0 /100 WBC (ref 0–0.2)
PLATELET # BLD AUTO: 372 K/UL (ref 164–446)
PMV BLD AUTO: 10.2 FL (ref 9–12.9)
POTASSIUM SERPL-SCNC: 4 MMOL/L (ref 3.6–5.5)
PROT SERPL-MCNC: 7.2 G/DL (ref 6–8.2)
RBC # BLD AUTO: 5.08 M/UL (ref 4.2–5.4)
SODIUM SERPL-SCNC: 139 MMOL/L (ref 135–145)
TRIGL SERPL-MCNC: 176 MG/DL (ref 0–149)
TSH SERPL DL<=0.005 MIU/L-ACNC: 1.01 UIU/ML (ref 0.38–5.33)
WBC # BLD AUTO: 8.9 K/UL (ref 4.8–10.8)

## 2024-11-07 PROCEDURE — 84443 ASSAY THYROID STIM HORMONE: CPT

## 2024-11-07 PROCEDURE — 80053 COMPREHEN METABOLIC PANEL: CPT

## 2024-11-07 PROCEDURE — 83036 HEMOGLOBIN GLYCOSYLATED A1C: CPT

## 2024-11-07 PROCEDURE — 36415 COLL VENOUS BLD VENIPUNCTURE: CPT

## 2024-11-07 PROCEDURE — 87389 HIV-1 AG W/HIV-1&-2 AB AG IA: CPT

## 2024-11-07 PROCEDURE — 80061 LIPID PANEL: CPT

## 2024-11-07 PROCEDURE — 86803 HEPATITIS C AB TEST: CPT

## 2024-11-07 PROCEDURE — 85025 COMPLETE CBC W/AUTO DIFF WBC: CPT

## 2024-11-20 NOTE — TELEPHONE ENCOUNTER
Outcome: Phone number disconnected     Please transfer to Patient Outreach Team at [phone] when patient returns call.      Attempt # 2

## 2024-12-06 DIAGNOSIS — I71.21 ANEURYSM OF ASCENDING AORTA WITHOUT RUPTURE (HCC): ICD-10-CM

## 2024-12-06 NOTE — PROGRESS NOTES
Michael J Bloch, M.D. (Physician)  Vascular Medicine  Encounter Date: 3/2/2024  Signed  CTA with perhaps modest grown in ascnending TAA - but still overall small.   Repeat echo in one year - march 2025     Michael Bloch, MD  Vascular Care                Orders placed for vascular surveillance imaging.    BEETmobile message sent to pt to remind that they are due for their surveillance vascular imaging.       Alley Vargas R.N.   [facility]

## 2025-01-29 ENCOUNTER — DOCUMENTATION (OUTPATIENT)
Dept: VASCULAR LAB | Facility: MEDICAL CENTER | Age: 55
End: 2025-01-29
Payer: MEDICAID

## 2025-01-29 NOTE — PROGRESS NOTES
Echo and cTA chest done at outside institution demonstrate 4.6 cm aneurysm ascending aorta similar to previous.  Will plan repeat echo in one year - jan 2026.   RN to update surveillance wendy.   Patient overdue for vasc med f/u - will ask MA to contact.    Michael Bloch, MD  Vascular Care

## 2025-01-30 ENCOUNTER — TELEPHONE (OUTPATIENT)
Dept: VASCULAR LAB | Facility: MEDICAL CENTER | Age: 55
End: 2025-01-30
Payer: MEDICAID

## 2025-01-30 NOTE — TELEPHONE ENCOUNTER
Called patient to schedule follow up in Vascular clinic as she is overdue. Patients phone number is out of service. Called mother (emergency contact) and this number is also out of service.    Unable to reach patient to schedule.           Alyce Powers, Medical Assistant   [facility]   Ph: [phone]  Fx: [phone]

## 2025-01-30 NOTE — TELEPHONE ENCOUNTER
----- Message from Physician Michael Bloch, M.D. sent at 1/29/2025  2:57 PM PST -----  Regarding: imaging  Bc - kym echo and CTA as done. Put on wendy for echo in one year.   KD - patient overdue for f/u. Pls schedule with apn  ----- Message -----  From: Alley Vargas R.N.  Sent: 1/27/2025   2:08 PM PST  To: Michael J Bloch, M.D.    On wendy for march echo. Completed in jan while admitted. CTA chest also completed.

## 2025-02-04 ENCOUNTER — DOCUMENTATION (OUTPATIENT)
Dept: VASCULAR LAB | Facility: MEDICAL CENTER | Age: 55
End: 2025-02-04
Payer: MEDICAID

## 2025-02-05 NOTE — PROGRESS NOTES
Left message for patient to call back and schedule follow up with CADY.         Alyce Powers, Medical Assistant   [facility]   Ph: [phone]  Fx: [phone]

## 2025-02-11 ENCOUNTER — TELEPHONE (OUTPATIENT)
Dept: VASCULAR LAB | Facility: MEDICAL CENTER | Age: 55
End: 2025-02-11
Payer: MEDICAID

## 2025-02-11 NOTE — TELEPHONE ENCOUNTER
Left message for patient to call back and schedule follow up with APRN, next available ok.         Alyce Powers, Medical Assistant   [facility]   Ph: [phone]  Fx: [phone]

## 2025-02-11 NOTE — TELEPHONE ENCOUNTER
----- Message from Physician Michael Bloch, M.D. sent at 1/30/2025  6:30 PM PST -----  Regarding: RE: imaging  In the demographics of her [facility] chart under care anywhere I found   [phone] - maybe give that a try  Mb  ----- Message -----  From: Alyce Powers, Med Ass't  Sent: 1/30/2025   2:45 PM PST  To: Michael J Bloch, M.D.  Subject: RE: imaging                                      Both patient and emergency contact phone numbers are no longer in service.  ----- Message -----  From: Michael J Bloch, M.D.  Sent: 1/29/2025   2:57 PM PST  To: Alyce Powers, Med Ass't; #  Subject: imaging                                          Bc - kym echo and CTA as done. Put on wendy for echo in one year.   KD - patient overdue for f/u. Pls schedule with apn  ----- Message -----  From: Alley Vargas R.N.  Sent: 1/27/2025   2:08 PM PST  To: Michael J Bloch, M.D.    On wendy for march echo. Completed in jan while admitted. CTA chest also completed.

## 2025-02-18 ENCOUNTER — DOCUMENTATION (OUTPATIENT)
Dept: VASCULAR LAB | Facility: MEDICAL CENTER | Age: 55
End: 2025-02-18
Payer: MEDICAID

## 2025-02-18 NOTE — PROGRESS NOTES
Left message for patient to call back and schedule follow up with ARPN. Next available ok.             Alyce Powers, Medical Assistant   [facility]   Ph: [phone]  Fx: [phone]

## 2025-02-28 ENCOUNTER — OFFICE VISIT (OUTPATIENT)
Dept: MEDICAL GROUP | Facility: CLINIC | Age: 55
End: 2025-02-28
Payer: MEDICAID

## 2025-02-28 VITALS
RESPIRATION RATE: 16 BRPM | WEIGHT: 220 LBS | BODY MASS INDEX: 35.36 KG/M2 | TEMPERATURE: 98.2 F | HEIGHT: 66 IN | SYSTOLIC BLOOD PRESSURE: 132 MMHG | OXYGEN SATURATION: 98 % | DIASTOLIC BLOOD PRESSURE: 86 MMHG | HEART RATE: 78 BPM

## 2025-02-28 DIAGNOSIS — N39.3 STRESS INCONTINENCE: ICD-10-CM

## 2025-02-28 DIAGNOSIS — I71.21 ANEURYSM OF ASCENDING AORTA WITHOUT RUPTURE (HCC): ICD-10-CM

## 2025-02-28 DIAGNOSIS — Z12.11 COLON CANCER SCREENING: ICD-10-CM

## 2025-02-28 DIAGNOSIS — Z12.31 ENCOUNTER FOR SCREENING MAMMOGRAM FOR MALIGNANT NEOPLASM OF BREAST: ICD-10-CM

## 2025-02-28 DIAGNOSIS — M25.552 LEFT HIP PAIN: ICD-10-CM

## 2025-02-28 DIAGNOSIS — M25.551 RIGHT HIP PAIN: ICD-10-CM

## 2025-02-28 LAB
APPEARANCE UR: CLEAR
BILIRUB UR STRIP-MCNC: NEGATIVE MG/DL
COLOR UR AUTO: YELLOW
GLUCOSE UR STRIP.AUTO-MCNC: NEGATIVE MG/DL
KETONES UR STRIP.AUTO-MCNC: NEGATIVE MG/DL
LEUKOCYTE ESTERASE UR QL STRIP.AUTO: NORMAL
NITRITE UR QL STRIP.AUTO: NEGATIVE
PH UR STRIP.AUTO: 6.5 [PH] (ref 5–8)
PROT UR QL STRIP: NEGATIVE MG/DL
RBC UR QL AUTO: NORMAL
SP GR UR STRIP.AUTO: 1.01
UROBILINOGEN UR STRIP-MCNC: 0.2 MG/DL

## 2025-02-28 ASSESSMENT — FIBROSIS 4 INDEX: FIB4 SCORE: 0.72

## 2025-02-28 NOTE — ASSESSMENT & PLAN NOTE
Chronic.  Slowly increasing in size.  Discussed options for treatment evaluation.  In shared decision making:  - Referral to vascular medicine for monitoring and risk factor mitigation  - ED and return precautions discussed

## 2025-02-28 NOTE — ASSESSMENT & PLAN NOTE
Chronic.  No red flags.  Positive logroll and LORENA.  Likely related to osteoarthritis.  Discussed options for evaluation and treatment. Shared decision making:  - X-ray left hip  - Referral to orthopedic surgery

## 2025-02-28 NOTE — ASSESSMENT & PLAN NOTE
Chronic.  Worsening.  Appears to be stress incontinence.  No red flag signs.  In shared decision making:  - UA will contact patient with results  - Pelvic physical therapy  - Follow in 6 to 8 weeks after PT or sooner with new or worsening concerns  - ED and return precautions discussed

## 2025-02-28 NOTE — PATIENT INSTRUCTIONS
-XR right hip   -Referral to orthopedic surgery (they will reach out within 10 days.  If you do not hear from them then send me a message on Giraffic)  -Pelvic PT (they will reach out within 10 days.  If you do not hear from them then send me a message on Covarityt)  -Referral to vascular medicine (they will reach out within 10 days.  If you do not hear from them then send me a message on Covarityt)  Mammogram, call to schedule   -Referral to GI for colonoscopy (they will reach out within 10 days.  If you do not hear from them then send me a message on Covarityt)  -Follow in 6-8 weeks after PT

## 2025-02-28 NOTE — PROGRESS NOTES
"Subjective:     CC:   Chief Complaint   Patient presents with    Referral Needed     Ortho referral, R hip pain [id number]     Bladder Problem     Bladder control issues [id number], bladder leaks         HPI:   Thlema presents today with    Problem   Right Hip Pain    Patient with chronic right hip pain.  Reports it started hurting like her left hip did before her left hip was replaced 3 years ago.      Stress Incontinence    Patient reports a longstanding history of stress incontinence.  Reports it used to only happen with coughing or sneezing but now she will get it when she walks.  She denies any dysuria, urgency, frequency. It is small-volume and not large voids. No waking up with wet sheets, night sweats, B symptoms no blood in urine.     Ascending Aortic Aneurysm (Hcc)    Patient with recent AAA scan at 4.6 cm.  Was 4.2 cm in March 2024 and 3.8 cm in March 2023.  Patient is not having any symptomology.    History of longstanding hypertension and patient reports 3 prior MIs.  Has been taking her antihypertensives as prescribed.           ROS: See HPI.     Objective:     Exam:  /86   Pulse 78   Temp 36.8 °C (98.2 °F) (Temporal)   Resp 16   Ht 1.676 m (5' 6\")   Wt 99.8 kg (220 lb)   SpO2 98%   BMI 35.51 kg/m²  Body mass index is 35.51 kg/m².    Physical Exam:  General: Pt resting in NAD, cooperative   Skin:  No cyanosis or jaundice   HEENT: NC/AT. EOMI. No conjunctival injection or sclera icterus.   Lungs:  CTAB, good air movement. Non-labored.   Cardiovascular:  S1/S2 RRR   Abdomen:  Abdomen is soft, non-tender, non-distended, +BS  MSK: Positive logroll right.  Positive bilateral Sheron right greater than left  Extremities:  No LE edema   CNS:  No gross focal neurologic deficits  Psych: Appropriate mood and affect     Labs: Reviewed    Assessment & Plan:     55 y.o. female with the following -     Problem List Items Addressed This Visit       Ascending aortic aneurysm (HCC)    Chronic.  Slowly increasing " in size.  Discussed options for treatment evaluation.  In shared decision making:  - Referral to vascular medicine for monitoring and risk factor mitigation  - ED and return precautions discussed         Relevant Orders    Referral to Vascular Medicine    Right hip pain    Chronic.  No red flags.  Positive logroll and LORENA.  Likely related to osteoarthritis.  Discussed options for evaluation and treatment. Shared decision making:  - X-ray left hip  - Referral to orthopedic surgery         Stress incontinence    Chronic.  Worsening.  Appears to be stress incontinence.  No red flag signs.  In shared decision making:  - UA will contact patient with results  - Pelvic physical therapy  - Follow in 6 to 8 weeks after PT or sooner with new or worsening concerns  - ED and return precautions discussed         Relevant Orders    URINALYSIS    Referral to Physical Therapy    POCT Urinalysis     Other Visit Diagnoses         Encounter for screening mammogram for malignant neoplasm of breast        Relevant Orders    MA-SCREENING MAMMO BILAT W/TOMOSYNTHESIS W/CAD      Colon cancer screening        Relevant Orders    Referral to GI for Colonoscopy          Will contact patient with UA results    Follow as above in 6 to 8 weeks after physical therapy or sooner with new or worsening concerns.  ED and return precautions discussed

## 2025-03-04 NOTE — Clinical Note
REFERRAL APPROVAL NOTICE         Sent on March 4, 2025                   Thelma Segovia  [address]  [place] [address]                   Dear Ms. Segovia,    After a careful review of the medical information and benefit coverage, [facility] has processed your referral. See below for additional details.    If applicable, you must be actively enrolled with your insurance for coverage of the authorized service. If you have any questions regarding your coverage, please contact your insurance directly.    REFERRAL INFORMATION   Referral #:  [id number]  Referred-To Department    Referred-By Provider:  Orthopedics    Latanya Santizo M.D.   Department Of Surgery      [address]  [place] [address]  [phone] [address], [address]  [place] [address] [phone]    Referral Start Date:  02/28/2025  Referral End Date:   02/28/2026             SCHEDULING  If you do not already have an appointment, please call [phone] to make an appointment.     MORE INFORMATION  If you do not already have a [employer] account, sign up at: [email or web address]  You can access your medical information, make appointments, see lab results, billing information, and more.  If you have questions regarding this referral, please contact  the [facility] Referrals department at:             [phone]. Monday - Friday 8:00AM - 5:00PM.     Sincerely,    [facility]

## 2025-03-04 NOTE — Clinical Note
REFERRAL APPROVAL NOTICE         Sent on March 4, 2025                   Thelma Segovia  [address]  [place] [address]                   Dear Ms. Segovia,    After a careful review of the medical information and benefit coverage, [facility] has processed your referral. See below for additional details.    If applicable, you must be actively enrolled with your insurance for coverage of the authorized service. If you have any questions regarding your coverage, please contact your insurance directly.    REFERRAL INFORMATION   Referral #:  [id number]  Referred-To Department    Referred-By Provider:  Vascular Medicine    Latanya Santizo M.D.   Vascular Medicine      [address]  [place] [address]  [phone] [address] [place] [address]  [place] [address]  [phone]    Referral Start Date:  02/28/2025  Referral End Date:   02/28/2026             SCHEDULING  If you do not already have an appointment, please call [phone] to make an appointment.     MORE INFORMATION  If you do not already have a [employer] account, sign up at: [email or web address]  You can access your medical information, make appointments, see lab results, billing information, and more.  If you have questions regarding this referral, please contact  the [facility] Referrals department at:             [phone]. Monday - Friday 8:00AM - 5:00PM.     Sincerely,    [facility]

## 2025-03-04 NOTE — Clinical Note
REFERRAL APPROVAL NOTICE         Sent on March 4, 2025                   Thelma Segovia  [address]  [place] [address]                   Dear Ms. Segovia,    After a careful review of the medical information and benefit coverage, [facility] has processed your referral. See below for additional details.    If applicable, you must be actively enrolled with your insurance for coverage of the authorized service. If you have any questions regarding your coverage, please contact your insurance directly.    REFERRAL INFORMATION   Referral #:  [id number]  Referred-To Provider    Referred-By Provider:  Gastroenterology    Latanya Santizo M.D.   [facility]      [address]   [place] [address]  [phone] [address]  [place] [address]  [phone]    Referral Start Date:  02/28/2025  Referral End Date:   02/28/2026             SCHEDULING  If you do not already have an appointment, please call [phone] to make an appointment.     MORE INFORMATION  If you do not already have a [employer] account, sign up at: [email or web address]  You can access your medical information, make appointments, see lab results, billing information, and more.  If you have questions regarding this referral, please contact  the [facility] Referrals department at:             [phone]. Monday - Friday 8:00AM - 5:00PM.     Sincerely,    [facility]

## 2025-03-04 NOTE — Clinical Note
REFERRAL APPROVAL NOTICE         Sent on March 4, 2025                   Thelma Segovia  [address]  [place] [address]                   Dear MsIsidro Segovia,    After a careful review of the medical information and benefit coverage, [facility] has processed your referral. See below for additional details.    If applicable, you must be actively enrolled with your insurance for coverage of the authorized service. If you have any questions regarding your coverage, please contact your insurance directly.    REFERRAL INFORMATION   Referral #:  [id number]  Referred-To Provider    Referred-By Provider:  Physical Therapy    Latanya Santizo M.D.   [facility]      [address]  [place] [address]  [phone] [address]  [place] [address]  [phone]    Referral Start Date:  02/28/2025  Referral End Date:   02/28/2026             SCHEDULING  If you do not already have an appointment, please call [phone] to make an appointment.     MORE INFORMATION  If you do not already have a [employer] account, sign up at: [email or web address]  You can access your medical information, make appointments, see lab results, billing information, and more.  If you have questions regarding this referral, please contact  the [facility] Referrals department at:             [phone]. Monday - Friday 8:00AM - 5:00PM.     Sincerely,    [facility]

## 2025-03-05 ENCOUNTER — TELEPHONE (OUTPATIENT)
Dept: HEALTH INFORMATION MANAGEMENT | Facility: OTHER | Age: 55
End: 2025-03-05
Payer: MEDICAID

## 2025-04-01 ENCOUNTER — DOCUMENTATION (OUTPATIENT)
Dept: VASCULAR LAB | Facility: MEDICAL CENTER | Age: 55
End: 2025-04-01
Payer: MEDICAID

## 2025-04-01 NOTE — PROGRESS NOTES
Patient overdue for follow-up appt with vascular care.  Medical assistant has been unable to reach and reschedule  Multiple messages have been left  Await further patient contact.    Will ask nurse navigator to contact    Pending further contact, will defer all vascular care, including management of cardiac vascular risk factors, to PCP    Michael J. Bloch, MD  Vascular Care    Cc: PRABHAKAR Santizo

## 2025-04-10 ENCOUNTER — HOSPITAL ENCOUNTER (OUTPATIENT)
Dept: RADIOLOGY | Facility: MEDICAL CENTER | Age: 55
End: 2025-04-10
Payer: MEDICAID

## 2025-04-10 ENCOUNTER — OFFICE VISIT (OUTPATIENT)
Facility: MEDICAL CENTER | Age: 55
End: 2025-04-10
Payer: MEDICAID

## 2025-04-10 VITALS
OXYGEN SATURATION: 96 % | WEIGHT: 218.26 LBS | TEMPERATURE: 97.3 F | HEIGHT: 66 IN | BODY MASS INDEX: 35.08 KG/M2 | HEART RATE: 93 BPM

## 2025-04-10 DIAGNOSIS — M25.552 LEFT HIP PAIN: ICD-10-CM

## 2025-04-10 DIAGNOSIS — M16.11 PRIMARY OSTEOARTHRITIS OF RIGHT HIP: ICD-10-CM

## 2025-04-10 PROCEDURE — 73521 X-RAY EXAM HIPS BI 2 VIEWS: CPT

## 2025-04-10 PROCEDURE — 99204 OFFICE O/P NEW MOD 45 MIN: CPT | Performed by: ORTHOPAEDIC SURGERY

## 2025-04-10 RX ORDER — MELOXICAM 15 MG/1
15 TABLET ORAL DAILY
Qty: 30 TABLET | Refills: 2 | Status: SHIPPED | OUTPATIENT
Start: 2025-04-10

## 2025-04-10 ASSESSMENT — FIBROSIS 4 INDEX: FIB4 SCORE: 0.72

## 2025-04-10 NOTE — PROGRESS NOTES
[facility] Orthopedic Surgery    Subjective:   4/10/2025  1:37 PM  Primary care physician:Latanya Santizo M.D.    Chief Complaint:   Right hip pain    History of presenting illness:  Thelma Segovia  is a pleasant 55 y.o. female who was referred for evaluation of right hip pain. The pain is located anterior. This has been ongoing for years but worsening over last few months. There was no injury or trauma. Therapies tried NSAIDs and tylenol . This has not relieved symptoms adequately.  She had a left total hip arthroplasty a few years ago and has had a great result.  She has no pain in the left hip.  She denies any complications with the hip after surgery.    History of diabetes: no  History of tobacco use: current smoker  History of renal disease: no  Use of anticoagulants: no  Prior surgeries on this limb/joint: no    Past Medical History:   Diagnosis Date    COPD (chronic obstructive pulmonary disease) ([facility])     Hypertension     Lupus     MI (myocardial infarction) ([facility]) 2007     Past Surgical History:   Procedure Laterality Date    HYSTERECTOMY LAPAROSCOPY      HYSTERECTOMY, TOTAL ABDOMINAL      MS C-SEC ONLY,PREV C-SEC       Allergies   Allergen Reactions    Pcn [Penicillins] Anaphylaxis, Shortness of Breath and Swelling     Outpatient Encounter Medications as of 4/10/2025   Medication Sig Dispense Refill    meloxicam (MOBIC) 15 MG tablet Take 1 Tablet by mouth every day. 30 Tablet 2    ipratropium (ATROVENT HFA) 17 MCG/ACT Aero Soln Inhale 2 Puffs 2 times a day. 12.9 g 0    albuterol 108 (90 Base) MCG/ACT Aero Soln inhalation aerosol Inhale 2 Puffs every four hours as needed for Shortness of Breath. 8.5 g 0    amLODIPine (NORVASC) 5 MG Tab Take 2 Tablets by mouth every day. 180 Tablet 3    losartan (COZAAR) 50 MG Tab Take 2 Tablets by mouth every day. 180 Tablet 3    metoprolol SR (TOPROL XL) 25 MG TABLET SR 24 HR Take 1 Tablet by mouth every day. 90 Tablet 3    aspirin 81 MG EC tablet Take 1 Tablet by mouth  every day. 90 Tablet 3    atorvastatin (LIPITOR) 40 MG Tab Take 1 Tablet by mouth every evening. 90 Tablet 3    nitroGLYCERIN (NITROSTAT) 0.3 MG SL tablet PLACE 1 TABLET UNDER THE TONGUE AS NEEDED FOR CHEST PAIN( EVERY 5 MINUTES FOR CHEST PAIN UP TO 3 DOSES 100 Tablet 0    nicotine (NICODERM) 7 MG/24HR PATCH 24 HR Place 1 Patch on the skin every 24 hours. 30 Patch 0    Blood Pressure Monitoring (COMFORT TOUCH BP CUFF/MEDIUM) Misc 1 Each every day. 1 Each 3     No facility-administered encounter medications on file as of 4/10/2025.     Social History     Socioeconomic History    Marital status: Single     Spouse name: Not on file    Number of children: Not on file    Years of education: Not on file    Highest education level: GED or equivalent   Occupational History    Not on file   Tobacco Use    Smoking status: Every Day     Current packs/day: 0.50     Average packs/day: 1.5 packs/day for 40.9 years (60.4 ttl pk-yrs)     Types: Cigarettes     Start date: 6/1/2024    Smokeless tobacco: Never   Vaping Use    Vaping status: Never Used   Substance and Sexual Activity    Alcohol use: No    Drug use: No    Sexual activity: Not on file   Other Topics Concern    Not on file   Social History Narrative    ** Merged History Encounter **         ** Merged History Encounter **          Social Drivers of Health     Financial Resource Strain: Patient Declined (1/2/2025)    Received from [facility]    Overall Financial Resource Strain (CARDIA)     Difficulty of Paying Living Expenses: Patient declined   Food Insecurity: Food Insecurity Present (1/2/2025)    Received from [facility]    Hunger Vital Sign     Worried About Running Out of Food in the Last Year: Sometimes true     Ran Out of Food in the Last Year: Patient declined   Transportation Needs: Patient Declined (1/2/2025)    Received from [facility]    PRAPARE - Transportation     Lack of Transportation (Medical): Patient declined     Lack of Transportation  (Non-Medical): Patient declined   Physical Activity: Patient Declined (1/2/2025)    Received from [facility]    Exercise Vital Sign     Days of Exercise per Week: Patient declined     Minutes of Exercise per Session: Patient declined   Stress: Patient Declined (1/2/2025)    Received from [facility]    [employer] - Occupational Stress Questionnaire     Feeling of Stress : Patient declined   Social Connections: Patient Declined (1/2/2025)    Received from [facility]    Social Connection and Isolation Panel [NHANES]     Frequency of Communication with Friends and Family: Patient declined     Frequency of Social Gatherings with Friends and Family: Patient declined     Attends Evangelical Services: Patient declined     Active Member of Clubs or Organizations: Patient declined     Attends Club or Organization Meetings: Patient declined     Marital Status: Patient declined   Intimate Partner Violence: Not At Risk (1/1/2025)    Received from [facility]    Humiliation, Afraid, Rape, and Kick questionnaire     Fear of Current or Ex-Partner: No     Emotionally Abused: No     Physically Abused: No     Sexually Abused: No   Housing Stability: Patient Declined (1/2/2025)    Received from [facility]    Housing Stability Vital Sign     Unable to Pay for Housing in the Last Year: Patient declined     Number of Times Moved in the Last Year: 1     Homeless in the Last Year: Patient declined      Social History     Tobacco Use   Smoking Status Every Day    Current packs/day: 0.50    Average packs/day: 1.5 packs/day for 40.9 years (60.4 ttl pk-yrs)    Types: Cigarettes    Start date: 6/1/2024   Smokeless Tobacco Never     Social History     Substance and Sexual Activity   Alcohol Use No     Social History     Substance and Sexual Activity   Drug Use No        No family history on file.    Review of Systems   Musculoskeletal:  Positive for back pain and joint pain.   Neurological:  " Negative for tingling, sensory change and weakness.        Objective:   Pulse 93   Temp 36.3 °C (97.3 °F) (Temporal)   Ht 1.676 m (5' 6\")   Wt 99 kg (218 lb 4.1 oz)   SpO2 96%   BMI 35.23 kg/m²     Physical Exam  Constitutional:       General: She is not in acute distress.     Appearance: Normal appearance. She is not ill-appearing.   HENT:      Head: Normocephalic and atraumatic.      Right Ear: External ear normal.      Left Ear: External ear normal.      Mouth/Throat:      Mouth: Mucous membranes are moist.   Eyes:      Extraocular Movements: Extraocular movements intact.      Conjunctiva/sclera: Conjunctivae normal.   Cardiovascular:      Rate and Rhythm: Normal rate.      Pulses: Normal pulses.   Pulmonary:      Effort: Pulmonary effort is normal. No respiratory distress.   Musculoskeletal:      Cervical back: Normal range of motion and neck supple.   Skin:     General: Skin is warm and dry.      Capillary Refill: Capillary refill takes less than 2 seconds.   Neurological:      Mental Status: She is alert and oriented to person, place, and time.   Psychiatric:         Mood and Affect: Mood normal.         Behavior: Behavior normal.       Hip Exam -   right hip: Overlying skin demonstrates no erythema, ecchymoses or wounds. Hip ROM is limited.  SILT spn, dpn, plantar and sural nerves. Motor intact to knee extension, ankle dorsiflexion, ankle plantar flexion, and eversion. DP/PT pulse 2+. There is no tenderness at the greater trochanter. There is not tenderness elsewhere. positive FADIR. positive Stinchfield.       Imaging:  Multiple views of the pelvis and bilateral hips demonstrates severe degenerative changes including joint space narrowing, subchondral sclerosis, periarticular osteophytes, and subchondral cysts of the right femoroacetabular joint. There are no acute fractures or destructive osseous lesions. There are degenerative changes in the visualized lumbar spine.  There is a left total hip " arthroplasty without apparent complication.  There is some stress shielding in the metaphysis.      Diagnosis:     1. Primary osteoarthritis of right hip  meloxicam (MOBIC) 15 MG tablet              Assessment/Plan:   I counseled the patient regarding the above diagnosis.  We reviewed the natural history and etiology of hip osteoarthritis.We discussed conservative and surgical treatment options in detail. Conservative treatment options include weight loss, low impact exercise (pool, bike, etc), oral NSAIDs, corticosteroid injections, and ablative procedures of the sensory nerves to the joint. Surgical treatment includes total joint arthroplasty.    The patient would like to proceed with right total hip arthroplasty.  I counseled her that I think she is a candidate for hip arthroplasty and would likely have good benefit from the surgery.  However, this is an elective surgery and albeit fairly low risk for complications they can be severe and smoking/nicotine is a well-known risk factor for complications most notably infection.  For this reason she would need to quit cigarette and nicotine use prior to surgery.  She verbalized understanding and will work on this.  In the meantime she wanted to try meloxicam so I will send in prescription to her pharmacy today.  I discussed the risk and benefits of the medication.  I instructed her to always take with food and water.  She will follow-up once she has quit tobacco and nicotine.    All questions were answered and they were in agreement with the plan.     Referrals: none  Restrictions: none  New medications/refills: meloxicam 15 mg qd  Imaging studies: none  Follow-up: after smoking/nicotine cessation       Jerry Ashton, DO  Orthopedic Oncology and General Orthopedics

## 2025-04-11 ASSESSMENT — ENCOUNTER SYMPTOMS
TINGLING: 0
SENSORY CHANGE: 0
WEAKNESS: 0
BACK PAIN: 1

## 2025-06-13 ENCOUNTER — TELEPHONE (OUTPATIENT)
Dept: VASCULAR LAB | Facility: MEDICAL CENTER | Age: 55
End: 2025-06-13
Payer: MEDICAID

## 2025-06-13 NOTE — TELEPHONE ENCOUNTER
Called patient regarding NP appointment with Dr. Lazo. Called to confirm if this will be first time patient is Vascular Med provider and review if any recent testing completed or hospital admissions. No answer, phone number in chart is no longer active.    Correct appointment type? Yes  Referral attached to appointment? Yes  New patient packet sent via [employer]?  Yes

## 2025-06-18 ENCOUNTER — DOCUMENTATION (OUTPATIENT)
Dept: VASCULAR LAB | Facility: MEDICAL CENTER | Age: 55
End: 2025-06-18
Payer: MEDICAID

## 2025-06-18 NOTE — PROGRESS NOTES
Thelma Segovia has been referred for evaluation and management in the vascular care clinic.     Unfortunately patient missed appointment for initial visit in our office today.  We will be unable to take part in care until or unless patient makes an appointment for a face-to-face visit in our center  We will ask our  or medical assistant to call and reschedule  WITH ANY NEXT AVAILABLE PROVIDER      Pending further patient contact, we will defer all vascular care, including management of cardiovascular risk factors, to PCP and other members of the care team    Vascular Medicine Clinic   [facility]   [phone]

## 2025-06-24 ENCOUNTER — TELEPHONE (OUTPATIENT)
Dept: HEALTH INFORMATION MANAGEMENT | Facility: OTHER | Age: 55
End: 2025-06-24
Payer: MEDICAID

## 2025-07-22 ENCOUNTER — DOCUMENTATION (OUTPATIENT)
Facility: MEDICAL CENTER | Age: 55
End: 2025-07-22
Payer: MEDICAID

## 2025-07-22 NOTE — PROGRESS NOTES
Patient overdue for follow-up appt with vascular care.  Medical assistant has been unable to reach and reschedule  Multiple messages have been left  Await further patient contact.  Pending further contact, will defer all vascular care, including management of cardiac vascular risk factors, to PCP    Michael J. Bloch, MD  Vascular Care    Cc:  PRABHAKAR Santizo